# Patient Record
Sex: MALE | Race: BLACK OR AFRICAN AMERICAN | Employment: OTHER | URBAN - METROPOLITAN AREA
[De-identification: names, ages, dates, MRNs, and addresses within clinical notes are randomized per-mention and may not be internally consistent; named-entity substitution may affect disease eponyms.]

---

## 2023-03-20 LAB
AMPHETAMINE (PRESENCE) IN URINE BY SCREEN METHOD: ABNORMAL
BARBITURATES PRESENCE IN URINE BY SCREEN METHOD: ABNORMAL
BENZODIAZEPINE (PRESENCE) IN URINE BY SCREEN METHOD: ABNORMAL
CANNABINOIDS IN URINE BY SCREEN METHOD: ABNORMAL
COCAINE (PRESENCE) IN URINE BY SCREEN METHOD: ABNORMAL
DRUG SCREEN COMMENT URINE: ABNORMAL
FENTANYL URINE: ABNORMAL
METHADONE (PRESENCE) IN URINE BY SCREEN METHOD: ABNORMAL
OPIATES (PRESENCE) IN URINE BY SCREEN METHOD: ABNORMAL
OXYCODONE (PRESENCE) IN URINE BY SCREEN METHOD: ABNORMAL
PHENCYCLIDINE (PRESENCE) IN URINE BY SCREEN METHOD: ABNORMAL

## 2023-03-25 LAB
BUTALBITAL, URN, QUANT: <50 NG/ML
PENTOBARBITAL, URN, QUANT: <50 NG/ML
PHENOBARBITAL, URN, QUANT: >5000 NG/ML

## 2023-11-16 PROBLEM — G40.909 SEIZURE DISORDER (MULTI): Status: ACTIVE | Noted: 2023-11-16

## 2023-11-16 PROBLEM — G40.209 PARTIAL SYMPTOMATIC EPILEPSY WITH COMPLEX PARTIAL SEIZURES, NOT INTRACTABLE, WITHOUT STATUS EPILEPTICUS (MULTI): Status: ACTIVE | Noted: 2023-11-16

## 2023-11-16 PROBLEM — G25.0 BENIGN ESSENTIAL TREMOR: Status: ACTIVE | Noted: 2023-11-16

## 2023-11-16 PROBLEM — I10 ESSENTIAL HYPERTENSION, BENIGN: Status: ACTIVE | Noted: 2023-11-16

## 2023-11-16 RX ORDER — ROSUVASTATIN CALCIUM 10 MG/1
1 TABLET, COATED ORAL DAILY
COMMUNITY
Start: 2022-09-15

## 2023-11-16 RX ORDER — METOPROLOL SUCCINATE 50 MG/1
TABLET, EXTENDED RELEASE ORAL EVERY 12 HOURS
COMMUNITY
End: 2024-01-23 | Stop reason: SDUPTHER

## 2023-11-16 RX ORDER — AMLODIPINE BESYLATE 10 MG/1
1 TABLET ORAL DAILY
COMMUNITY
End: 2024-01-08 | Stop reason: ALTCHOICE

## 2023-11-16 RX ORDER — DIVALPROEX SODIUM 250 MG/1
TABLET, DELAYED RELEASE ORAL
COMMUNITY
Start: 2019-01-29 | End: 2024-01-04 | Stop reason: SDUPTHER

## 2023-11-16 RX ORDER — METFORMIN HYDROCHLORIDE 500 MG/1
TABLET ORAL EVERY 12 HOURS
COMMUNITY

## 2023-11-16 RX ORDER — LOSARTAN POTASSIUM 100 MG/1
1 TABLET ORAL DAILY
COMMUNITY

## 2023-11-16 RX ORDER — DIVALPROEX SODIUM 500 MG/1
TABLET, DELAYED RELEASE ORAL
COMMUNITY
Start: 2019-01-29 | End: 2024-01-04 | Stop reason: SDUPTHER

## 2023-11-16 RX ORDER — PHENOBARBITAL 32.4 MG/1
TABLET ORAL
COMMUNITY
Start: 2018-08-01 | End: 2024-01-04 | Stop reason: SDUPTHER

## 2023-11-16 RX ORDER — FLUOXETINE HYDROCHLORIDE 20 MG/1
1 CAPSULE ORAL DAILY
COMMUNITY
Start: 2022-02-09

## 2024-01-04 DIAGNOSIS — G40.209 PARTIAL SYMPTOMATIC EPILEPSY WITH COMPLEX PARTIAL SEIZURES, NOT INTRACTABLE, WITHOUT STATUS EPILEPTICUS (MULTI): Primary | ICD-10-CM

## 2024-01-04 RX ORDER — PHENOBARBITAL 32.4 MG/1
TABLET ORAL
Qty: 270 TABLET | Refills: 0 | Status: SHIPPED | OUTPATIENT
Start: 2024-01-04 | End: 2024-03-18 | Stop reason: SDUPTHER

## 2024-01-04 RX ORDER — DIVALPROEX SODIUM 500 MG/1
500 TABLET, DELAYED RELEASE ORAL 2 TIMES DAILY
Qty: 180 TABLET | Refills: 0 | Status: SHIPPED | OUTPATIENT
Start: 2024-01-04 | End: 2024-03-18 | Stop reason: SDUPTHER

## 2024-01-04 RX ORDER — DIVALPROEX SODIUM 250 MG/1
250 TABLET, DELAYED RELEASE ORAL 2 TIMES DAILY
Qty: 180 TABLET | Refills: 0 | Status: SHIPPED | OUTPATIENT
Start: 2024-01-04 | End: 2024-03-18 | Stop reason: SDUPTHER

## 2024-01-08 ENCOUNTER — OFFICE VISIT (OUTPATIENT)
Dept: CARDIOLOGY | Facility: CLINIC | Age: 69
End: 2024-01-08
Payer: MEDICARE

## 2024-01-08 VITALS
DIASTOLIC BLOOD PRESSURE: 80 MMHG | HEART RATE: 68 BPM | TEMPERATURE: 97.5 F | SYSTOLIC BLOOD PRESSURE: 120 MMHG | BODY MASS INDEX: 35.15 KG/M2 | WEIGHT: 252 LBS

## 2024-01-08 DIAGNOSIS — I10 ESSENTIAL HYPERTENSION, BENIGN: Primary | ICD-10-CM

## 2024-01-08 DIAGNOSIS — E78.5 HYPERLIPIDEMIA, UNSPECIFIED HYPERLIPIDEMIA TYPE: ICD-10-CM

## 2024-01-08 DIAGNOSIS — I49.3 PVC (PREMATURE VENTRICULAR CONTRACTION): ICD-10-CM

## 2024-01-08 PROCEDURE — 1125F AMNT PAIN NOTED PAIN PRSNT: CPT | Performed by: INTERNAL MEDICINE

## 2024-01-08 PROCEDURE — 3074F SYST BP LT 130 MM HG: CPT | Performed by: INTERNAL MEDICINE

## 2024-01-08 PROCEDURE — 3079F DIAST BP 80-89 MM HG: CPT | Performed by: INTERNAL MEDICINE

## 2024-01-08 PROCEDURE — 1157F ADVNC CARE PLAN IN RCRD: CPT | Performed by: INTERNAL MEDICINE

## 2024-01-08 PROCEDURE — 99214 OFFICE O/P EST MOD 30 MIN: CPT | Performed by: INTERNAL MEDICINE

## 2024-01-08 PROCEDURE — 1036F TOBACCO NON-USER: CPT | Performed by: INTERNAL MEDICINE

## 2024-01-08 RX ORDER — AMLODIPINE BESYLATE 5 MG/1
5 TABLET ORAL 2 TIMES DAILY
COMMUNITY
Start: 2023-11-19

## 2024-01-08 RX ORDER — LEVOTHYROXINE SODIUM 50 UG/1
50 TABLET ORAL DAILY
COMMUNITY
Start: 2023-12-11

## 2024-01-08 RX ORDER — NAPROXEN 500 MG/1
500 TABLET ORAL
COMMUNITY
Start: 2023-02-23

## 2024-01-08 RX ORDER — TIZANIDINE 4 MG/1
4 TABLET ORAL EVERY 8 HOURS PRN
COMMUNITY
Start: 2023-03-25

## 2024-01-08 ASSESSMENT — PAIN SCALES - GENERAL: PAINLEVEL: 8

## 2024-01-08 NOTE — PROGRESS NOTES
1. Seizure disorder  2. DVT/IVC filter  3. Chronic back pain  4. PVCs  5. Hypertension  6. Hyperlipidemia  7. obesity BMI greater than 34    Patient is a pleasant 68-year-old male with the above noted pertinent past medical history who presents today for follow-up.  He walks with help of a cane his level of activity remains limited, he has no complaints of orthopnea PND palpitation or syncopal episode, he states that he has remained compliant with his medication..    BMI is mildly increased now at 34, heart rate of 68 bpm and blood pressure 120/80 mmHg  Well-developed well-nourished male in no acute distress speaking full sentences no carotid bruits upstroke and volumes and central venous pressures are normal, heart rate and rhythm are regular S1 and S2 are normal no gallop or murmurs, lungs are clear to auscultation, abdomen is obese positive bowel sounds soft and nontender    No new lab work is available    Hypertension under good control medication list is reviewed no changes indicated  Hyperlipidemia patient is provided with requisition for CBC CMP and lipid panel will notify patient of the test results  Overweight heart healthy diet and weight loss recommended  DVT with history of IVC filter  Premature ventricular complexes now with no complaints of palpitation  Patient is a non-smoker  Return to my clinic in 6 months.

## 2024-01-23 DIAGNOSIS — I10 ESSENTIAL HYPERTENSION, BENIGN: Primary | ICD-10-CM

## 2024-01-25 DIAGNOSIS — I10 ESSENTIAL HYPERTENSION, BENIGN: ICD-10-CM

## 2024-01-25 RX ORDER — METOPROLOL SUCCINATE 50 MG/1
50 TABLET, EXTENDED RELEASE ORAL EVERY 12 HOURS
Qty: 60 TABLET | Refills: 5 | Status: SHIPPED | OUTPATIENT
Start: 2024-01-25 | End: 2024-01-30

## 2024-01-30 RX ORDER — METOPROLOL SUCCINATE 50 MG/1
50 TABLET, EXTENDED RELEASE ORAL
Qty: 180 TABLET | Refills: 3 | Status: SHIPPED | OUTPATIENT
Start: 2024-01-30 | End: 2025-01-29

## 2024-03-18 ENCOUNTER — OFFICE VISIT (OUTPATIENT)
Dept: NEUROLOGY | Facility: CLINIC | Age: 69
End: 2024-03-18
Payer: MEDICARE

## 2024-03-18 VITALS
WEIGHT: 249 LBS | SYSTOLIC BLOOD PRESSURE: 164 MMHG | HEIGHT: 69 IN | BODY MASS INDEX: 36.88 KG/M2 | HEART RATE: 74 BPM | TEMPERATURE: 96.9 F | DIASTOLIC BLOOD PRESSURE: 94 MMHG

## 2024-03-18 DIAGNOSIS — G40.209 PARTIAL SYMPTOMATIC EPILEPSY WITH COMPLEX PARTIAL SEIZURES, NOT INTRACTABLE, WITHOUT STATUS EPILEPTICUS (MULTI): Primary | ICD-10-CM

## 2024-03-18 DIAGNOSIS — Z79.899 CONTROLLED SUBSTANCE AGREEMENT SIGNED: ICD-10-CM

## 2024-03-18 PROCEDURE — 3080F DIAST BP >= 90 MM HG: CPT | Performed by: PSYCHIATRY & NEUROLOGY

## 2024-03-18 PROCEDURE — 1159F MED LIST DOCD IN RCRD: CPT | Performed by: PSYCHIATRY & NEUROLOGY

## 2024-03-18 PROCEDURE — 1036F TOBACCO NON-USER: CPT | Performed by: PSYCHIATRY & NEUROLOGY

## 2024-03-18 PROCEDURE — 3077F SYST BP >= 140 MM HG: CPT | Performed by: PSYCHIATRY & NEUROLOGY

## 2024-03-18 PROCEDURE — 99214 OFFICE O/P EST MOD 30 MIN: CPT | Performed by: PSYCHIATRY & NEUROLOGY

## 2024-03-18 PROCEDURE — 1157F ADVNC CARE PLAN IN RCRD: CPT | Performed by: PSYCHIATRY & NEUROLOGY

## 2024-03-18 RX ORDER — ASPIRIN 325 MG
TABLET, DELAYED RELEASE (ENTERIC COATED) ORAL
COMMUNITY
Start: 2009-10-22

## 2024-03-18 RX ORDER — PHENOBARBITAL 32.4 MG/1
TABLET ORAL
Qty: 270 TABLET | Refills: 1 | Status: SHIPPED | OUTPATIENT
Start: 2024-03-18 | End: 2024-04-15 | Stop reason: SDUPTHER

## 2024-03-18 RX ORDER — DIVALPROEX SODIUM 250 MG/1
250 TABLET, DELAYED RELEASE ORAL 2 TIMES DAILY
Qty: 180 TABLET | Refills: 3 | Status: SHIPPED | OUTPATIENT
Start: 2024-03-18 | End: 2025-03-18

## 2024-03-18 RX ORDER — DIVALPROEX SODIUM 500 MG/1
500 TABLET, DELAYED RELEASE ORAL 2 TIMES DAILY
Qty: 180 TABLET | Refills: 3 | Status: SHIPPED | OUTPATIENT
Start: 2024-03-18 | End: 2025-03-18

## 2024-03-18 RX ORDER — HYDROGEN PEROXIDE 3 %
20 SOLUTION, NON-ORAL MISCELLANEOUS
COMMUNITY

## 2024-03-18 RX ORDER — TAMSULOSIN HYDROCHLORIDE 0.4 MG/1
0.4 CAPSULE ORAL DAILY
COMMUNITY

## 2024-03-18 NOTE — PROGRESS NOTES
NEUROLOGY OUTPATIENT FOLLOW-UP NOTE    Assessment/Plan   Diagnoses and all orders for this visit:  Partial symptomatic epilepsy with complex partial seizures, not intractable, without status epilepticus (CMS/HCC)  -     Phenobarbital level; Future  -     Valproic acid level, total; Future  -     PHENobarbitaL (Luminal) 32.4 mg tablet; 1 by mouth in morning, 2 in evening  -     divalproex (Depakote) 250 mg EC tablet; Take 1 tablet (250 mg) by mouth 2 times a day. Take with 500 mg tablet  -     divalproex (Depakote) 500 mg EC tablet; Take 1 tablet (500 mg) by mouth 2 times a day. Take with 250 mg tablet  Controlled substance agreement signed  -     Drug Screen, Urine With Reflex to Confirmation; Future      IMPRESSION: Stable complex partial seizures and benign essential tremor. Lumbar strain     PLAN: To continue divalproex 750 mg bid and phenobarbital 30 mg qam and 60 mg qpm. I will see him again in another year or prn.      Maxi Garces Jr., M.D., FAAN   ----------    I have personally reviewed the [place] report for VINEET YADAV. I have considered the risks of abuse, dependence, addiction and diversion.   Last urine drug screening date/ordered today: 03/18/2024  Controlled Substance Agreement:   I have printed this form and reviewed each line item with the patient and the patient has verbalized understanding.   Date of the last Controlled Substance Agreement: 03/18/2024    Subjective     Vineet Yadav is a 69 y.o. year old male here for follow-up.    No seizures since the last visit.  He denies new focal neurological symptoms including dysarthria, dysphagia, diplopia, focal weakness, focal sensory change, ataxia, vertigo, or bowel/bladder incontinence, among others.      Past Medical History:   Diagnosis Date    Dry eye syndrome of bilateral lacrimal glands     Dry eyes    Other conditions influencing health status     Abdominal Paracentesis (Diagnostic)    Personal history of other diseases of the  circulatory system     History of hypertension    Personal history of other diseases of the musculoskeletal system and connective tissue     History of osteoporosis    Personal history of other endocrine, nutritional and metabolic disease     History of high cholesterol    Personal history of other specified conditions     History of seizure    Unspecified fracture of right femur, initial encounter for closed fracture (CMS/HCC)     Femur fracture, right    Ventricular premature depolarization     Premature ventricular contraction     Past Surgical History:   Procedure Laterality Date    OTHER SURGICAL HISTORY  04/11/2018    Reported Hx Of Hip Replacement    OTHER SURGICAL HISTORY  12/01/2021    Complete colonoscopy    OTHER SURGICAL HISTORY  12/01/2021    Femur fracture repair    OTHER SURGICAL HISTORY  12/01/2021    Surgery    OTHER SURGICAL HISTORY  12/01/2021    Tonsillectomy     Social History     Tobacco Use    Smoking status: Never     Passive exposure: Never    Smokeless tobacco: Never   Substance Use Topics    Alcohol use: Never     family history is not on file.    Current Outpatient Medications:     aspirin (Ecotrin) 325 mg EC tablet, Take by mouth., Disp: , Rfl:     FLUoxetine (PROzac) 20 mg capsule, Take 1 capsule (20 mg) by mouth once daily., Disp: , Rfl:     rosuvastatin (Crestor) 10 mg tablet, Take 1 tablet (10 mg) by mouth once daily., Disp: , Rfl:     amLODIPine (Norvasc) 5 mg tablet, Take 1 tablet (5 mg) by mouth 2 times a day., Disp: , Rfl:     divalproex (Depakote) 250 mg EC tablet, Take 1 tablet (250 mg) by mouth 2 times a day. Take with 500 mg tablet, Disp: 180 tablet, Rfl: 0    divalproex (Depakote) 500 mg EC tablet, Take 1 tablet (500 mg) by mouth 2 times a day. Take with 250 mg tablet, Disp: 180 tablet, Rfl: 0    esomeprazole (NexIUM) 20 mg DR capsule, Take 1 capsule (20 mg) by mouth once daily., Disp: , Rfl:     levothyroxine (Synthroid, Levoxyl) 50 mcg tablet, Take 1 tablet (50 mcg) by  "mouth once daily., Disp: , Rfl:     losartan (Cozaar) 100 mg tablet, Take 1 tablet (100 mg) by mouth once daily., Disp: , Rfl:     metFORMIN (Glucophage) 500 mg tablet, Take by mouth every 12 hours., Disp: , Rfl:     metoprolol succinate XL (Toprol-XL) 50 mg 24 hr tablet, Take 1 tablet (50 mg) by mouth 2 times a day., Disp: 180 tablet, Rfl: 3    naproxen (Naprosyn) 500 mg tablet, Take 1 tablet (500 mg) by mouth 2 times a day with meals., Disp: , Rfl:     PHENobarbitaL (Luminal) 32.4 mg tablet, 1 by mouth in morning, 2 in evening, Disp: 270 tablet, Rfl: 0    tamsulosin (Flomax) 0.4 mg 24 hr capsule, Take 1 capsule (0.4 mg) by mouth once daily., Disp: , Rfl:     tiZANidine (Zanaflex) 4 mg tablet, Take 1 tablet (4 mg) by mouth every 8 hours if needed., Disp: , Rfl:   Allergies   Allergen Reactions    Cefazolin Diarrhea    Codeine Nausea/vomiting    Metoclopramide Unknown    Phenytoin Confusion and Unknown       Objective     BP (!) 164/94   Pulse 74   Temp 36.1 °C (96.9 °F)   Ht 1.753 m (5' 9\")   Wt 113 kg (249 lb)   BMI 36.77 kg/m²     CONSTITUTIONAL:  No acute distress    MENTAL STATUS:  Awake, alert, fully oriented to self, place, and time, with present short-term memory, good awareness of recent events, normal attention span, concentration, and fund of knowledge.    SPEECH AND LANGUAGE:  Can name and repeat, follows all commands, has no dysarthria    CRANIAL NERVES:  II-Vision present, visual fields full to confrontational testing    III/IV/VI--EOMs are present in all directions.  Pupils are symmetrically reactive in dim light.  No ptosis.    V--Normal facial sensation.    VII--No facial asymmetry.    VIII--Hearing present to voice bilaterally.    IX/X--Symmetric soft palate rise.    XI--Normal trapezius power bilaterally.    XII--Tongue protrudes without deviation.    MOTOR:  Normal power, tone, and bulk in both arms and both legs.    SENSORY:  Normal pin sensation in both arms and both legs without " distal-proximal gradient, asymmetry, or spinal sensory level.    COORDINATION:  Normal finger-to-nose and heel-to-shin testing in both arms and both legs.    REFLEXES are normal and symmetric at the biceps, triceps, brachioradialis, patella, and ankle.  The plantar responses are flexor.    GAIT is antalgic but otherwise normal, without steppage, ataxia, shuffling, or spasticity.  He is more steady with a single-post cane.      Maxi Garces Jr., M.D., FAAN

## 2024-04-10 DIAGNOSIS — G40.209 PARTIAL SYMPTOMATIC EPILEPSY WITH COMPLEX PARTIAL SEIZURES, NOT INTRACTABLE, WITHOUT STATUS EPILEPTICUS (MULTI): ICD-10-CM

## 2024-04-19 LAB
Lab: 20.1 UG/ML (ref 15–40)
NON-UH HIE A/G RATIO: 1.2
NON-UH HIE ALB: 4 G/DL (ref 3.4–5)
NON-UH HIE ALK PHOS: 110 UNIT/L (ref 45–117)
NON-UH HIE AMPHETAMINES, U: NEGATIVE
NON-UH HIE BARBITUATES, U: POSITIVE
NON-UH HIE BENZODIAZEPINES, U: NEGATIVE
NON-UH HIE BILIRUBIN, TOTAL: 0.3 MG/DL (ref 0.3–1.2)
NON-UH HIE BUN/CREAT RATIO: 20
NON-UH HIE BUN: 16 MG/DL (ref 9–23)
NON-UH HIE CALCIUM: 9.4 MG/DL (ref 8.7–10.4)
NON-UH HIE CALCULATED LDL CHOLESTEROL: 112 MG/DL (ref 60–130)
NON-UH HIE CALCULATED OSMOLALITY: 266 MOSM/KG (ref 275–295)
NON-UH HIE CHLORIDE: 93 MMOL/L (ref 98–107)
NON-UH HIE CHOLESTEROL: 194 MG/DL (ref 100–200)
NON-UH HIE CO2, VENOUS: 28 MMOL/L (ref 20–31)
NON-UH HIE COCAINE, U: NEGATIVE
NON-UH HIE CREATININE: 0.8 MG/DL (ref 0.6–1.1)
NON-UH HIE DATE LAST DOSE: ABNORMAL
NON-UH HIE DATE LAST DOSE: NORMAL
NON-UH HIE ECSTASY, U: NEGATIVE
NON-UH HIE FENTANYL, U: NEGATIVE
NON-UH HIE GFR AA: >60
NON-UH HIE GLOBULIN: 3.2 G/DL
NON-UH HIE GLOMERULAR FILTRATION RATE: >60 ML/MIN/1.73M?
NON-UH HIE GLUCOSE: 138 MG/DL (ref 74–106)
NON-UH HIE GOT: 28 UNIT/L (ref 15–37)
NON-UH HIE GPT: 44 UNIT/L (ref 10–49)
NON-UH HIE HCT: 41.6 % (ref 41–52)
NON-UH HIE HDL CHOLESTEROL: 48 MG/DL (ref 40–60)
NON-UH HIE HGB: 14.6 G/DL (ref 13.5–17.5)
NON-UH HIE INSTR WBC ND: 6.1
NON-UH HIE K: 4.7 MMOL/L (ref 3.5–5.1)
NON-UH HIE MCH: 32.3 PG (ref 27–34)
NON-UH HIE MCHC: 35.1 G/DL (ref 32–37)
NON-UH HIE MCV: 92.1 FL (ref 80–100)
NON-UH HIE MPV: 7.1 FL (ref 7.4–10.4)
NON-UH HIE NA: 131 MMOL/L (ref 135–145)
NON-UH HIE OPIATES, U: NEGATIVE
NON-UH HIE PCP, U: NEGATIVE
NON-UH HIE PLATELET: 164 X10 (ref 150–450)
NON-UH HIE RBC: 4.52 X10 (ref 4.7–6.1)
NON-UH HIE RDW: 14 % (ref 11.5–14.5)
NON-UH HIE THC, U: NEGATIVE
NON-UH HIE TIME LAST DOSE: ABNORMAL
NON-UH HIE TIME LAST DOSE: NORMAL
NON-UH HIE TOTAL CHOL/HDL CHOL RATIO: 4
NON-UH HIE TOTAL PROTEIN: 7.2 G/DL (ref 5.7–8.2)
NON-UH HIE TRIGLYCERIDES: 172 MG/DL (ref 30–150)
NON-UH HIE VALPROIC ACID: 100.7 UG/ML (ref 50–100)
NON-UH HIE WBC: 6.1 X10 (ref 4.5–11)

## 2024-04-19 RX ORDER — PHENOBARBITAL 32.4 MG/1
TABLET ORAL
Qty: 270 TABLET | Refills: 1 | Status: SHIPPED | OUTPATIENT
Start: 2024-04-19

## 2024-07-08 ENCOUNTER — APPOINTMENT (OUTPATIENT)
Dept: CARDIOLOGY | Facility: CLINIC | Age: 69
End: 2024-07-08
Payer: MEDICARE

## 2024-07-10 ENCOUNTER — APPOINTMENT (OUTPATIENT)
Dept: CARDIOLOGY | Facility: CLINIC | Age: 69
End: 2024-07-10
Payer: MEDICARE

## 2024-08-19 ENCOUNTER — NURSING HOME VISIT (OUTPATIENT)
Dept: POST ACUTE CARE | Facility: EXTERNAL LOCATION | Age: 69
End: 2024-08-19
Payer: MEDICARE

## 2024-08-19 DIAGNOSIS — I10 ESSENTIAL HYPERTENSION, BENIGN: ICD-10-CM

## 2024-08-19 DIAGNOSIS — G25.0 BENIGN ESSENTIAL TREMOR: Primary | ICD-10-CM

## 2024-08-19 DIAGNOSIS — G40.209 PARTIAL SYMPTOMATIC EPILEPSY WITH COMPLEX PARTIAL SEIZURES, NOT INTRACTABLE, WITHOUT STATUS EPILEPTICUS (MULTI): ICD-10-CM

## 2024-08-19 DIAGNOSIS — G40.909 SEIZURE DISORDER (MULTI): ICD-10-CM

## 2024-08-19 PROCEDURE — 99305 1ST NF CARE MODERATE MDM 35: CPT | Performed by: INTERNAL MEDICINE

## 2024-08-19 NOTE — LETTER
Patient: Vineet Lira  : 1955    Encounter Date: 2024    HISTORY AND PHYSICAL    Subjective  Chief complaint: Vineet Lira is a 69 y.o. male who is an acute skilled patient being seen and evaluated for weakness    HPI:  Patient is a 69 year old male with a past medical history of seizures, HTN, prediabetes, HLD and weakness. Patient was admitted to the skilled nursing facility after being in the hospital. Patient presented to the ED after a fall at home. Patient was found to have more falls and gait instability in ED and new relatively rapid cognitive impairment. MRI showed acute fractures of t12 and L2 and foraminal stenosis at L4-5. Patient was evaluated by neuro, PT/OT and was discharged to the skilled nursing facility.     Objective  Vital signs: 140/60, 97.3, 109, 18, 95%    Physical Exam  Constitutional:       General: He is not in acute distress.  Eyes:      Extraocular Movements: Extraocular movements intact.   Pulmonary:      Effort: Pulmonary effort is normal.   Musculoskeletal:      Cervical back: Neck supple.   Neurological:      Mental Status: He is alert.   Psychiatric:         Mood and Affect: Mood normal.         Behavior: Behavior is cooperative.         Assessment/Plan  Problem List Items Addressed This Visit       Benign essential tremor - Primary     Continue current medications          Essential hypertension, benign     Continue current medications          Partial symptomatic epilepsy with complex partial seizures, not intractable, without status epilepticus (Multi)     Continue current medications          Seizure disorder (Multi)     Continue current medications           Medications, treatments, and labs reviewed  Continue medications and treatments as listed in [facility]    Scribe Attestation  I, Ingris Bennett   attest that this documentation has been prepared under the direction and in the presence of Kyle Jackson DO.    Provider Attestation - Ingris  documentation  All medical record entries made by the Scribe were at my direction and personally dictated by me. I have reviewed the chart and agree that the record accurately reflects my personal performance of the history, physical exam, discussion and plan.    Kyle Jackson DO        Electronically Signed By: Kyle Jackson DO   8/26/24 10:08 PM

## 2024-08-20 ENCOUNTER — NURSING HOME VISIT (OUTPATIENT)
Dept: POST ACUTE CARE | Facility: EXTERNAL LOCATION | Age: 69
End: 2024-08-20
Payer: MEDICARE

## 2024-08-20 DIAGNOSIS — I10 ESSENTIAL HYPERTENSION, BENIGN: ICD-10-CM

## 2024-08-20 DIAGNOSIS — G40.209 PARTIAL SYMPTOMATIC EPILEPSY WITH COMPLEX PARTIAL SEIZURES, NOT INTRACTABLE, WITHOUT STATUS EPILEPTICUS (MULTI): ICD-10-CM

## 2024-08-20 DIAGNOSIS — G40.909 SEIZURE DISORDER (MULTI): Primary | ICD-10-CM

## 2024-08-20 PROCEDURE — 99308 SBSQ NF CARE LOW MDM 20: CPT | Performed by: REGISTERED NURSE

## 2024-08-20 NOTE — PROGRESS NOTES
HISTORY AND PHYSICAL    Subjective   Chief complaint: Vineet Lira is a 69 y.o. male who is an acute skilled patient being seen and evaluated for weakness    HPI:  Patient is a 69 year old male with a past medical history of seizures, HTN, prediabetes, HLD and weakness. Patient was admitted to the skilled nursing facility after being in the hospital. Patient presented to the ED after a fall at home. Patient was found to have more falls and gait instability in ED and new relatively rapid cognitive impairment. MRI showed acute fractures of t12 and L2 and foraminal stenosis at L4-5. Patient was evaluated by neuro, PT/OT and was discharged to the skilled nursing facility.     Objective   Vital signs: 140/60, 97.3, 109, 18, 95%    Physical Exam  Constitutional:       General: He is not in acute distress.  Eyes:      Extraocular Movements: Extraocular movements intact.   Pulmonary:      Effort: Pulmonary effort is normal.   Musculoskeletal:      Cervical back: Neck supple.   Neurological:      Mental Status: He is alert.   Psychiatric:         Mood and Affect: Mood normal.         Behavior: Behavior is cooperative.         Assessment/Plan   Problem List Items Addressed This Visit       Benign essential tremor - Primary     Continue current medications          Essential hypertension, benign     Continue current medications          Partial symptomatic epilepsy with complex partial seizures, not intractable, without status epilepticus (Multi)     Continue current medications          Seizure disorder (Multi)     Continue current medications           Medications, treatments, and labs reviewed  Continue medications and treatments as listed in [facility]    Scribe Attestation  I, Ingris Bennett   attest that this documentation has been prepared under the direction and in the presence of Kyle Jackson DO.    Provider Attestation - Scribe documentation  All medical record entries made by the Scribe were at my  direction and personally dictated by me. I have reviewed the chart and agree that the record accurately reflects my personal performance of the history, physical exam, discussion and plan.    Kyle Jackson, DO

## 2024-08-20 NOTE — LETTER
Patient: Vineet Lira  : 1955    Encounter Date: 2024    PROGRESS NOTE    Subjective  Chief complaint: Vineet Lira is a 69 y.o. male who is an acute skilled patient being seen and evaluated for weakness    HPI:  24 Patient admitted to SNF for therapy d/t weakness after recent hospitalization.   Patient requires assist with ADLs and transfers.  No new complaints.      Objective  Vital signs: 188/76, 97.3, 109, 18, 101.0, 95%    Physical Exam  Constitutional:       General: He is not in acute distress.  Eyes:      Extraocular Movements: Extraocular movements intact.   Pulmonary:      Effort: Pulmonary effort is normal.   Musculoskeletal:      Cervical back: Neck supple.   Neurological:      Mental Status: He is alert.   Psychiatric:         Mood and Affect: Mood normal.         Behavior: Behavior is cooperative.         Assessment/Plan  Problem List Items Addressed This Visit       Essential hypertension, benign     Continue current medications          Partial symptomatic epilepsy with complex partial seizures, not intractable, without status epilepticus (Multi)     Continue current medications            Seizure disorder (Multi) - Primary     Continue current medications              Medications, treatments, and labs reviewed  Continue medications and treatments as listed in [facility]    Scribe Attestation  IJulianna Scribe   attest that this documentation has been prepared under the direction and in the presence of NORMA Cao.    Provider Attestation - Scribe documentation  All medical record entries made by the Scribe were at my direction and personally dictated by me. I have reviewed the chart and agree that the record accurately reflects my personal performance of the history, physical exam, discussion and plan.    NORMA Cao        Electronically Signed By: NORMA Cao   24  9:05 PM

## 2024-08-21 ENCOUNTER — NURSING HOME VISIT (OUTPATIENT)
Dept: POST ACUTE CARE | Facility: EXTERNAL LOCATION | Age: 69
End: 2024-08-21
Payer: MEDICARE

## 2024-08-21 DIAGNOSIS — G40.909 SEIZURE DISORDER (MULTI): ICD-10-CM

## 2024-08-21 DIAGNOSIS — G25.0 BENIGN ESSENTIAL TREMOR: ICD-10-CM

## 2024-08-21 DIAGNOSIS — R53.1 WEAKNESS: ICD-10-CM

## 2024-08-21 DIAGNOSIS — I10 ESSENTIAL HYPERTENSION, BENIGN: ICD-10-CM

## 2024-08-21 DIAGNOSIS — R00.2 PALPITATIONS: Primary | ICD-10-CM

## 2024-08-21 PROCEDURE — 99309 SBSQ NF CARE MODERATE MDM 30: CPT | Performed by: REGISTERED NURSE

## 2024-08-21 NOTE — LETTER
Patient: Vineet Lira  : 1955    Encounter Date: 2024    PROGRESS NOTE    Subjective  Chief complaint: Vineet Lira is a 69 y.o. male who is an acute skilled patient being seen and evaluated for weakness    HPI:  24 Patient seen and examined at [facility].  Therapy has been working with the patient to improve strength, endurance, ADLs, and transfers d/t generalized weakness.  Patient has no acute concerns today.    Objective  Vital signs: 138/90, 97, 72, 14, 101.0, 95%    Physical Exam  Constitutional:       General: He is not in acute distress.  Eyes:      Extraocular Movements: Extraocular movements intact.   Pulmonary:      Effort: Pulmonary effort is normal.   Musculoskeletal:      Cervical back: Neck supple.   Neurological:      Mental Status: He is alert.   Psychiatric:         Mood and Affect: Mood normal.         Behavior: Behavior is cooperative.         Assessment/Plan  Problem List Items Addressed This Visit       Benign essential tremor     Continue current medications          Essential hypertension, benign     Continue current medications          Seizure disorder (Multi)     Continue current medications             Weakness     Pt/ot          Palpitations - Primary     Pt c/o chest palpitations received beta blocker palpitations resolved   Monitor   If occurs again notify MD/CNP and obtain EKG           Medications, treatments, and labs reviewed  Continue medications and treatments as listed in [facility]    Scribe Attestation  I, Ingris Bennett   attest that this documentation has been prepared under the direction and in the presence of NORMA Cao.    Provider Attestation - Scribe documentation  All medical record entries made by the Scribe were at my direction and personally dictated by me. I have reviewed the chart and agree that the record accurately reflects my personal performance of the history, physical exam, discussion and plan.    Kyle JORDAN  NORMA Mena        Electronically Signed By: NORMA Cao   8/29/24 11:38 AM

## 2024-08-22 NOTE — PROGRESS NOTES
PROGRESS NOTE    Subjective   Chief complaint: Vineet Lira is a 69 y.o. male who is an acute skilled patient being seen and evaluated for weakness    HPI:  8/20/24 Patient admitted to SNF for therapy d/t weakness after recent hospitalization.   Patient requires assist with ADLs and transfers.  No new complaints.      Objective   Vital signs: 188/76, 97.3, 109, 18, 101.0, 95%    Physical Exam  Constitutional:       General: He is not in acute distress.  Eyes:      Extraocular Movements: Extraocular movements intact.   Pulmonary:      Effort: Pulmonary effort is normal.   Musculoskeletal:      Cervical back: Neck supple.   Neurological:      Mental Status: He is alert.   Psychiatric:         Mood and Affect: Mood normal.         Behavior: Behavior is cooperative.         Assessment/Plan   Problem List Items Addressed This Visit       Essential hypertension, benign     Continue current medications          Partial symptomatic epilepsy with complex partial seizures, not intractable, without status epilepticus (Multi)     Continue current medications            Seizure disorder (Multi) - Primary     Continue current medications              Medications, treatments, and labs reviewed  Continue medications and treatments as listed in [facility]    Scribe Attestation  IJulianna Scribe   attest that this documentation has been prepared under the direction and in the presence of NORMA Cao.    Provider Attestation - Scribe documentation  All medical record entries made by the Scribe were at my direction and personally dictated by me. I have reviewed the chart and agree that the record accurately reflects my personal performance of the history, physical exam, discussion and plan.    NORMA Cao

## 2024-08-23 ENCOUNTER — NURSING HOME VISIT (OUTPATIENT)
Dept: POST ACUTE CARE | Facility: EXTERNAL LOCATION | Age: 69
End: 2024-08-23
Payer: MEDICARE

## 2024-08-23 DIAGNOSIS — R53.1 WEAKNESS: ICD-10-CM

## 2024-08-23 DIAGNOSIS — G25.0 BENIGN ESSENTIAL TREMOR: ICD-10-CM

## 2024-08-23 DIAGNOSIS — I10 ESSENTIAL HYPERTENSION, BENIGN: ICD-10-CM

## 2024-08-23 DIAGNOSIS — G40.909 SEIZURE DISORDER (MULTI): Primary | ICD-10-CM

## 2024-08-23 PROCEDURE — 99308 SBSQ NF CARE LOW MDM 20: CPT | Performed by: REGISTERED NURSE

## 2024-08-23 NOTE — LETTER
Patient: Vineet Lira  : 1955    Encounter Date: 2024    PROGRESS NOTE    Subjective  Chief complaint: Vineet Lira is a 69 y.o. male who is an acute skilled patient being seen and evaluated for weakness    HPI:  24 Patient admitted to SNF for therapy d/t weakness after recent hospitalization.   Patient requires assist with ADLs and transfers.  No new complaints.      24 Patient has no new complaints or concerns today.  Patient is working in therapy.  Denies n/v/f/c.    Objective  Vital signs: 176/90, 97.6, 77, 14, 101.0, 95%    Physical Exam  Constitutional:       General: He is not in acute distress.  Eyes:      Extraocular Movements: Extraocular movements intact.   Pulmonary:      Effort: Pulmonary effort is normal.   Musculoskeletal:      Cervical back: Neck supple.   Neurological:      Mental Status: He is alert.   Psychiatric:         Mood and Affect: Mood normal.         Behavior: Behavior is cooperative.         Assessment/Plan  Problem List Items Addressed This Visit       Benign essential tremor     Continue current medications          Essential hypertension, benign     Continue current medications          Seizure disorder (Multi) - Primary     Continue current medications             Weakness     Pt/ot           Medications, treatments, and labs reviewed  Continue medications and treatments as listed in [facility]    Scribe Attestation  IJulianna Scribe   attest that this documentation has been prepared under the direction and in the presence of NORMA Cao.    Provider Attestation - Scribe documentation  All medical record entries made by the Scribe were at my direction and personally dictated by me. I have reviewed the chart and agree that the record accurately reflects my personal performance of the history, physical exam, discussion and plan.    NORMA Cao        Electronically Signed By: NORMA Cao   24   9:32 AM

## 2024-08-26 ENCOUNTER — NURSING HOME VISIT (OUTPATIENT)
Dept: POST ACUTE CARE | Facility: EXTERNAL LOCATION | Age: 69
End: 2024-08-26
Payer: MEDICARE

## 2024-08-26 DIAGNOSIS — G40.909 SEIZURE DISORDER (MULTI): ICD-10-CM

## 2024-08-26 DIAGNOSIS — R53.1 WEAKNESS: Primary | ICD-10-CM

## 2024-08-26 DIAGNOSIS — I10 ESSENTIAL HYPERTENSION, BENIGN: ICD-10-CM

## 2024-08-26 PROCEDURE — 99308 SBSQ NF CARE LOW MDM 20: CPT | Performed by: REGISTERED NURSE

## 2024-08-26 NOTE — LETTER
Patient: Vineet Lira  : 1955    Encounter Date: 2024    PROGRESS NOTE    Subjective  Chief complaint: Vineet Lira is a 69 y.o. male who is an acute skilled patient being seen and evaluated for weakness    HPI:  24 Patient admitted to SNF for therapy d/t weakness after recent hospitalization.   Patient requires assist with ADLs and transfers.  No new complaints.      24 Patient has no new complaints or concerns today.  Patient is working in therapy.  Denies n/v/f/c.    24 Therapy has been working with the patient to improve strength and endurance with ADLs, transfers, and mobility.  Patient continues to work toward goals.  Patient is stable and has no new complaints.  Nursing staff voices no new concerns today.    Objective  Vital signs: 138/90, 97, 72, 14, 101.0, 95%    Physical Exam  Constitutional:       General: He is not in acute distress.  Eyes:      Extraocular Movements: Extraocular movements intact.   Pulmonary:      Effort: Pulmonary effort is normal.   Musculoskeletal:      Cervical back: Neck supple.   Neurological:      Mental Status: He is alert.   Psychiatric:         Mood and Affect: Mood normal.         Behavior: Behavior is cooperative.         Assessment/Plan  Problem List Items Addressed This Visit       Essential hypertension, benign     Continue current medications          Seizure disorder (Multi)     Continue current medications             Weakness - Primary     Pt/ot           Medications, treatments, and labs reviewed  Continue medications and treatments as listed in [facility]    Scribe Attestation  I, Ingris Bennett   attest that this documentation has been prepared under the direction and in the presence of NORMA Cao.    Provider Attestation - Scribe documentation  All medical record entries made by the Scribe were at my direction and personally dictated by me. I have reviewed the chart and agree that the record accurately reflects  my personal performance of the history, physical exam, discussion and plan.    NORMA Cao        Electronically Signed By: NORMA Cao   9/2/24  1:50 PM

## 2024-08-27 ENCOUNTER — NURSING HOME VISIT (OUTPATIENT)
Dept: POST ACUTE CARE | Facility: EXTERNAL LOCATION | Age: 69
End: 2024-08-27
Payer: MEDICARE

## 2024-08-27 DIAGNOSIS — I10 ESSENTIAL HYPERTENSION, BENIGN: ICD-10-CM

## 2024-08-27 DIAGNOSIS — G40.909 SEIZURE DISORDER (MULTI): ICD-10-CM

## 2024-08-27 DIAGNOSIS — R53.1 WEAKNESS: Primary | ICD-10-CM

## 2024-08-27 PROCEDURE — 99308 SBSQ NF CARE LOW MDM 20: CPT | Performed by: REGISTERED NURSE

## 2024-08-27 NOTE — LETTER
Patient: Vineet Lira  : 1955    Encounter Date: 2024    PROGRESS NOTE    Subjective  Chief complaint: Vineet Lira is a 69 y.o. male who is an acute skilled patient being seen and evaluated for weakness    HPI:  24 Patient admitted to SNF for therapy d/t weakness after recent hospitalization.   Patient requires assist with ADLs and transfers.  No new complaints.      24 Patient has no new complaints or concerns today.  Patient is working in therapy.  Denies n/v/f/c.    24 Therapy has been working with the patient to improve strength and endurance with ADLs, transfers, and mobility.  Patient continues to work toward goals.  Patient is stable and has no new complaints.  Nursing staff voices no new concerns today.    24 Patient seen and examined at bedside.  Patient denies n/v/f/c.  Continues working in therapy.  No new complaints.    Objective  Vital signs: 138/90, 97, 72, 14, 101.0, 95%    Physical Exam  Constitutional:       General: He is not in acute distress.  Eyes:      Extraocular Movements: Extraocular movements intact.   Pulmonary:      Effort: Pulmonary effort is normal.   Musculoskeletal:      Cervical back: Neck supple.   Neurological:      Mental Status: He is alert.   Psychiatric:         Mood and Affect: Mood normal.         Behavior: Behavior is cooperative.         Assessment/Plan  Problem List Items Addressed This Visit       Essential hypertension, benign     Continue current medications          Seizure disorder (Multi)     Continue current medications             Weakness - Primary     Pt/ot           Medications, treatments, and labs reviewed  Continue medications and treatments as listed in [facility]    Scribe Attestation  I, Ingris Bennett   attest that this documentation has been prepared under the direction and in the presence of NORMA Cao.    Provider Attestation - Scribe documentation  All medical record entries made by the Scribe  were at my direction and personally dictated by me. I have reviewed the chart and agree that the record accurately reflects my personal performance of the history, physical exam, discussion and plan.    NORMA Cao        Electronically Signed By: NORMA Cao   9/3/24  5:22 PM

## 2024-08-27 NOTE — PROGRESS NOTES
PROGRESS NOTE    Subjective   Chief complaint: Vineet Lira is a 69 y.o. male who is an acute skilled patient being seen and evaluated for weakness    HPI:  8/20/24 Patient admitted to SNF for therapy d/t weakness after recent hospitalization.   Patient requires assist with ADLs and transfers.  No new complaints.      8/23/24 Patient has no new complaints or concerns today.  Patient is working in therapy.  Denies n/v/f/c.    Objective   Vital signs: 176/90, 97.6, 77, 14, 101.0, 95%    Physical Exam  Constitutional:       General: He is not in acute distress.  Eyes:      Extraocular Movements: Extraocular movements intact.   Pulmonary:      Effort: Pulmonary effort is normal.   Musculoskeletal:      Cervical back: Neck supple.   Neurological:      Mental Status: He is alert.   Psychiatric:         Mood and Affect: Mood normal.         Behavior: Behavior is cooperative.         Assessment/Plan   Problem List Items Addressed This Visit       Benign essential tremor     Continue current medications          Essential hypertension, benign     Continue current medications          Seizure disorder (Multi) - Primary     Continue current medications             Weakness     Pt/ot           Medications, treatments, and labs reviewed  Continue medications and treatments as listed in [facility]    Scribe Attestation  I, Ingris Bennett   attest that this documentation has been prepared under the direction and in the presence of NORMA Cao.    Provider Attestation - Scribe documentation  All medical record entries made by the Scribe were at my direction and personally dictated by me. I have reviewed the chart and agree that the record accurately reflects my personal performance of the history, physical exam, discussion and plan.    NORMA Cao

## 2024-08-28 ENCOUNTER — NURSING HOME VISIT (OUTPATIENT)
Dept: POST ACUTE CARE | Facility: EXTERNAL LOCATION | Age: 69
End: 2024-08-28
Payer: MEDICARE

## 2024-08-28 DIAGNOSIS — G40.909 SEIZURE DISORDER (MULTI): ICD-10-CM

## 2024-08-28 DIAGNOSIS — I10 ESSENTIAL HYPERTENSION, BENIGN: ICD-10-CM

## 2024-08-28 DIAGNOSIS — R53.1 WEAKNESS: Primary | ICD-10-CM

## 2024-08-28 PROCEDURE — 99308 SBSQ NF CARE LOW MDM 20: CPT | Performed by: REGISTERED NURSE

## 2024-08-28 NOTE — LETTER
Patient: Vineet Lira  : 1955    Encounter Date: 2024    PROGRESS NOTE    Subjective  Chief complaint: Vineet Lira is a 69 y.o. male who is an acute skilled patient being seen and evaluated for weakness    HPI:  24 Patient admitted to SNF for therapy d/t weakness after recent hospitalization.   Patient requires assist with ADLs and transfers.  No new complaints.      24 Patient has no new complaints or concerns today.  Patient is working in therapy.  Denies n/v/f/c.    24 Therapy has been working with the patient to improve strength and endurance with ADLs, transfers, and mobility.  Patient continues to work toward goals.  Patient is stable and has no new complaints.  Nursing staff voices no new concerns today.    24 Patient seen and examined at bedside.  Patient denies n/v/f/c.  Continues working in therapy.  No new complaints.    24 Patient in therapy d/t generalized weakness.  Patient presents for f/u.  Continues to work toward goals in therapy.  No new complaints at this time.    Objective  Vital signs: 138/90, 97, 72, 14, 101.0, 95%    Physical Exam  Constitutional:       General: He is not in acute distress.  Eyes:      Extraocular Movements: Extraocular movements intact.   Pulmonary:      Effort: Pulmonary effort is normal.   Musculoskeletal:      Cervical back: Neck supple.   Neurological:      Mental Status: He is alert.   Psychiatric:         Mood and Affect: Mood normal.         Behavior: Behavior is cooperative.         Assessment/Plan  Problem List Items Addressed This Visit       Essential hypertension, benign     Continue current medications          Seizure disorder (Multi)     Continue current medications             Weakness - Primary     Pt/ot           Medications, treatments, and labs reviewed  Continue medications and treatments as listed in [facility]    Scribe Attestation  I, Ingris Bennett   attest that this documentation has been prepared under the  direction and in the presence of NORMA Cao.    Provider Attestation - Scribe documentation  All medical record entries made by the Scribe were at my direction and personally dictated by me. I have reviewed the chart and agree that the record accurately reflects my personal performance of the history, physical exam, discussion and plan.    NORMA Cao        Electronically Signed By: NORMA Cao   9/4/24  8:02 PM

## 2024-08-29 PROBLEM — R53.1 WEAKNESS: Status: ACTIVE | Noted: 2024-08-29

## 2024-08-29 PROBLEM — R00.2 PALPITATIONS: Status: ACTIVE | Noted: 2024-08-29

## 2024-08-29 NOTE — PROGRESS NOTES
PROGRESS NOTE    Subjective   Chief complaint: Vineet Lira is a 69 y.o. male who is an acute skilled patient being seen and evaluated for weakness    HPI:  8/21/24 Patient seen and examined at [facility].  Therapy has been working with the patient to improve strength, endurance, ADLs, and transfers d/t generalized weakness.  Patient has no acute concerns today.    Objective   Vital signs: 138/90, 97, 72, 14, 101.0, 95%    Physical Exam  Constitutional:       General: He is not in acute distress.  Eyes:      Extraocular Movements: Extraocular movements intact.   Pulmonary:      Effort: Pulmonary effort is normal.   Musculoskeletal:      Cervical back: Neck supple.   Neurological:      Mental Status: He is alert.   Psychiatric:         Mood and Affect: Mood normal.         Behavior: Behavior is cooperative.         Assessment/Plan   Problem List Items Addressed This Visit       Benign essential tremor     Continue current medications          Essential hypertension, benign     Continue current medications          Seizure disorder (Multi)     Continue current medications             Weakness     Pt/ot          Palpitations - Primary     Pt c/o chest palpitations received beta blocker palpitations resolved   Monitor   If occurs again notify MD/CNP and obtain EKG           Medications, treatments, and labs reviewed  Continue medications and treatments as listed in [facility]    Scribe Attestation  I, Ingris Bennett   attest that this documentation has been prepared under the direction and in the presence of NORMA Cao.    Provider Attestation - Scribe documentation  All medical record entries made by the Scribe were at my direction and personally dictated by me. I have reviewed the chart and agree that the record accurately reflects my personal performance of the history, physical exam, discussion and plan.    NORMA Cao

## 2024-08-29 NOTE — PROGRESS NOTES
PROGRESS NOTE    Subjective   Chief complaint: Vineet Lira is a 69 y.o. male who is an acute skilled patient being seen and evaluated for weakness    HPI:  8/20/24 Patient admitted to SNF for therapy d/t weakness after recent hospitalization.   Patient requires assist with ADLs and transfers.  No new complaints.      8/23/24 Patient has no new complaints or concerns today.  Patient is working in therapy.  Denies n/v/f/c.    8/26/24 Therapy has been working with the patient to improve strength and endurance with ADLs, transfers, and mobility.  Patient continues to work toward goals.  Patient is stable and has no new complaints.  Nursing staff voices no new concerns today.    8/27/24 Patient seen and examined at bedside.  Patient denies n/v/f/c.  Continues working in therapy.  No new complaints.    Objective   Vital signs: 138/90, 97, 72, 14, 101.0, 95%    Physical Exam  Constitutional:       General: He is not in acute distress.  Eyes:      Extraocular Movements: Extraocular movements intact.   Pulmonary:      Effort: Pulmonary effort is normal.   Musculoskeletal:      Cervical back: Neck supple.   Neurological:      Mental Status: He is alert.   Psychiatric:         Mood and Affect: Mood normal.         Behavior: Behavior is cooperative.         Assessment/Plan   Problem List Items Addressed This Visit       Essential hypertension, benign     Continue current medications          Seizure disorder (Multi)     Continue current medications             Weakness - Primary     Pt/ot           Medications, treatments, and labs reviewed  Continue medications and treatments as listed in [facility]    Scribe Attestation  Julianna HRENANDEZ Scribe   attest that this documentation has been prepared under the direction and in the presence of NORMA Cao.    Provider Attestation - Scribe documentation  All medical record entries made by the Scribe were at my direction and personally dictated by me. I have reviewed  the chart and agree that the record accurately reflects my personal performance of the history, physical exam, discussion and plan.    Kyle Mena, APRN-CNP

## 2024-08-29 NOTE — PROGRESS NOTES
PROGRESS NOTE    Subjective   Chief complaint: Vineet Lira is a 69 y.o. male who is an acute skilled patient being seen and evaluated for weakness    HPI:  8/20/24 Patient admitted to SNF for therapy d/t weakness after recent hospitalization.   Patient requires assist with ADLs and transfers.  No new complaints.      8/23/24 Patient has no new complaints or concerns today.  Patient is working in therapy.  Denies n/v/f/c.    8/26/24 Therapy has been working with the patient to improve strength and endurance with ADLs, transfers, and mobility.  Patient continues to work toward goals.  Patient is stable and has no new complaints.  Nursing staff voices no new concerns today.    8/27/24 Patient seen and examined at bedside.  Patient denies n/v/f/c.  Continues working in therapy.  No new complaints.    8/28/24 Patient in therapy d/t generalized weakness.  Patient presents for f/u.  Continues to work toward goals in therapy.  No new complaints at this time.    Objective   Vital signs: 138/90, 97, 72, 14, 101.0, 95%    Physical Exam  Constitutional:       General: He is not in acute distress.  Eyes:      Extraocular Movements: Extraocular movements intact.   Pulmonary:      Effort: Pulmonary effort is normal.   Musculoskeletal:      Cervical back: Neck supple.   Neurological:      Mental Status: He is alert.   Psychiatric:         Mood and Affect: Mood normal.         Behavior: Behavior is cooperative.         Assessment/Plan   Problem List Items Addressed This Visit       Essential hypertension, benign     Continue current medications          Seizure disorder (Multi)     Continue current medications             Weakness - Primary     Pt/ot           Medications, treatments, and labs reviewed  Continue medications and treatments as listed in [facility]    Scribe Attestation  I, Ingris Bennett   attest that this documentation has been prepared under the direction and in the presence of Kyle Mena,  APRN-CNP.    Provider Attestation - Scribe documentation  All medical record entries made by the Scribe were at my direction and personally dictated by me. I have reviewed the chart and agree that the record accurately reflects my personal performance of the history, physical exam, discussion and plan.    Kyle Mena, APRN-CNP

## 2024-08-29 NOTE — ASSESSMENT & PLAN NOTE
Pt c/o chest palpitations received beta blocker palpitations resolved   Monitor   If occurs again notify MD/CNP and obtain EKG

## 2024-08-29 NOTE — PROGRESS NOTES
PROGRESS NOTE    Subjective   Chief complaint: Vineet Lira is a 69 y.o. male who is an acute skilled patient being seen and evaluated for weakness    HPI:  8/20/24 Patient admitted to SNF for therapy d/t weakness after recent hospitalization.   Patient requires assist with ADLs and transfers.  No new complaints.      8/23/24 Patient has no new complaints or concerns today.  Patient is working in therapy.  Denies n/v/f/c.    8/26/24 Therapy has been working with the patient to improve strength and endurance with ADLs, transfers, and mobility.  Patient continues to work toward goals.  Patient is stable and has no new complaints.  Nursing staff voices no new concerns today.    Objective   Vital signs: 138/90, 97, 72, 14, 101.0, 95%    Physical Exam  Constitutional:       General: He is not in acute distress.  Eyes:      Extraocular Movements: Extraocular movements intact.   Pulmonary:      Effort: Pulmonary effort is normal.   Musculoskeletal:      Cervical back: Neck supple.   Neurological:      Mental Status: He is alert.   Psychiatric:         Mood and Affect: Mood normal.         Behavior: Behavior is cooperative.         Assessment/Plan   Problem List Items Addressed This Visit       Essential hypertension, benign     Continue current medications          Seizure disorder (Multi)     Continue current medications             Weakness - Primary     Pt/ot           Medications, treatments, and labs reviewed  Continue medications and treatments as listed in [facility]    Scribe Attestation  IJulianna Scribe   attest that this documentation has been prepared under the direction and in the presence of NORMA Cao.    Provider Attestation - Scribe documentation  All medical record entries made by the Scribe were at my direction and personally dictated by me. I have reviewed the chart and agree that the record accurately reflects my personal performance of the history, physical exam, discussion  and plan.    Kyle Mena, APRN-CNP

## 2024-08-30 ENCOUNTER — NURSING HOME VISIT (OUTPATIENT)
Dept: POST ACUTE CARE | Facility: EXTERNAL LOCATION | Age: 69
End: 2024-08-30
Payer: MEDICARE

## 2024-08-30 DIAGNOSIS — G40.209 PARTIAL SYMPTOMATIC EPILEPSY WITH COMPLEX PARTIAL SEIZURES, NOT INTRACTABLE, WITHOUT STATUS EPILEPTICUS (MULTI): ICD-10-CM

## 2024-08-30 DIAGNOSIS — R53.1 WEAKNESS: Primary | ICD-10-CM

## 2024-08-30 DIAGNOSIS — I10 ESSENTIAL HYPERTENSION, BENIGN: ICD-10-CM

## 2024-08-30 PROCEDURE — 99308 SBSQ NF CARE LOW MDM 20: CPT | Performed by: INTERNAL MEDICINE

## 2024-08-30 NOTE — LETTER
Patient: Vineet Lira  : 1955    Encounter Date: 2024    PROGRESS NOTE    Subjective  Chief complaint: Vineet Lira is a 69 y.o. male who is an acute skilled patient being seen and evaluated for weakness    HPI:  24 Patient admitted to SNF for therapy d/t weakness after recent hospitalization.   Patient requires assist with ADLs and transfers.  No new complaints.      24 Patient has no new complaints or concerns today.  Patient is working in therapy.  Denies n/v/f/c.    24 Therapy has been working with the patient to improve strength and endurance with ADLs, transfers, and mobility.  Patient continues to work toward goals.  Patient is stable and has no new complaints.  Nursing staff voices no new concerns today.    24 Patient seen and examined at bedside.  Patient denies n/v/f/c.  Continues working in therapy.  No new complaints.    24 Patient in therapy d/t generalized weakness.  Patient presents for f/u.  Continues to work toward goals in therapy.  No new complaints at this time.    2024 Patient has been working in therapy to improve strength, endurance, and ADLs.  Patient continues to work toward goals.  No new concerns today.  Denies n/v/f/c pain.          Objective  Vital signs: 140/80, 97.2, 73, 18, 95%    Physical Exam  Constitutional:       General: He is not in acute distress.  Eyes:      Extraocular Movements: Extraocular movements intact.   Cardiovascular:      Rate and Rhythm: Normal rate and regular rhythm.   Pulmonary:      Effort: Pulmonary effort is normal.      Breath sounds: Normal breath sounds.   Abdominal:      General: Bowel sounds are normal.      Palpations: Abdomen is soft.   Musculoskeletal:      Cervical back: Neck supple.      Right lower leg: No edema.      Left lower leg: No edema.   Neurological:      Mental Status: He is alert.      Motor: Weakness present.   Psychiatric:         Mood and Affect: Mood normal.          Assessment/Plan  Problem List Items Addressed This Visit       Essential hypertension, benign     Continue current medications          Partial symptomatic epilepsy with complex partial seizures, not intractable, without status epilepticus (Multi)     Continue current medications            Weakness - Primary     Pt/ot           Medications, treatments, and labs reviewed  Continue medications and treatments as listed in EMR      Scribe Attestation  ILiang Scribe   attest that this documentation has been prepared under the direction and in the presence of Kyle Jackson DO    Provider Attestation - Scribe documentation  All medical record entries made by the Scribe were at my direction and personally dictated by me. I have reviewed the chart and agree that the record accurately reflects my personal performance of the history, physical exam, discussion and plan.   Kyle Jackson DO        Electronically Signed By: Kyle Jackson DO   12/29/24  9:46 AM

## 2024-09-03 ENCOUNTER — NURSING HOME VISIT (OUTPATIENT)
Dept: POST ACUTE CARE | Facility: EXTERNAL LOCATION | Age: 69
End: 2024-09-03
Payer: MEDICARE

## 2024-09-03 DIAGNOSIS — I10 ESSENTIAL HYPERTENSION, BENIGN: ICD-10-CM

## 2024-09-03 DIAGNOSIS — R53.1 WEAKNESS: Primary | ICD-10-CM

## 2024-09-03 DIAGNOSIS — G40.909 SEIZURE DISORDER (MULTI): ICD-10-CM

## 2024-09-03 DIAGNOSIS — G40.209 PARTIAL SYMPTOMATIC EPILEPSY WITH COMPLEX PARTIAL SEIZURES, NOT INTRACTABLE, WITHOUT STATUS EPILEPTICUS (MULTI): ICD-10-CM

## 2024-09-03 PROCEDURE — 99308 SBSQ NF CARE LOW MDM 20: CPT | Performed by: REGISTERED NURSE

## 2024-09-03 NOTE — LETTER
Patient: Vineet Lira  : 1955    Encounter Date: 2024    PROGRESS NOTE    Subjective  Chief complaint: Vineet Lira is a 69 y.o. male who is an acute skilled patient being seen and evaluated for weakness    HPI:  24 Patient admitted to SNF for therapy d/t weakness after recent hospitalization.   Patient requires assist with ADLs and transfers.  No new complaints.      24 Patient has no new complaints or concerns today.  Patient is working in therapy.  Denies n/v/f/c.    24 Therapy has been working with the patient to improve strength and endurance with ADLs, transfers, and mobility.  Patient continues to work toward goals.  Patient is stable and has no new complaints.  Nursing staff voices no new concerns today.    24 Patient seen and examined at bedside.  Patient denies n/v/f/c.  Continues working in therapy.  No new complaints.    24 Patient in therapy d/t generalized weakness.  Patient presents for f/u.  Continues to work toward goals in therapy.  No new complaints at this time.    9/3/24 Patient has no new complaints or concerns today.  Continues working towards goals in therapy.  Denies constitutional symptoms.    Objective  Vital signs: 126/74, 97.5, 78, 18, 220.0, 96%    Physical Exam  Constitutional:       General: He is not in acute distress.  Eyes:      Extraocular Movements: Extraocular movements intact.   Pulmonary:      Effort: Pulmonary effort is normal.   Musculoskeletal:      Cervical back: Neck supple.   Neurological:      Mental Status: He is alert.   Psychiatric:         Mood and Affect: Mood normal.         Behavior: Behavior is cooperative.         Assessment/Plan  Problem List Items Addressed This Visit       Essential hypertension, benign     Continue current medications          Partial symptomatic epilepsy with complex partial seizures, not intractable, without status epilepticus (Multi)     Continue current medications            Seizure disorder  (Multi)     Continue current medications             Weakness - Primary     Pt/ot           Medications, treatments, and labs reviewed  Continue medications and treatments as listed in [facility]    Scribe Attestation  I, Ingris Bennett   attest that this documentation has been prepared under the direction and in the presence of NORMA Cao.    Provider Attestation - Scribe documentation  All medical record entries made by the Scribe were at my direction and personally dictated by me. I have reviewed the chart and agree that the record accurately reflects my personal performance of the history, physical exam, discussion and plan.    NORMA Cao        Electronically Signed By: NORMA Cao   9/10/24  7:42 PM

## 2024-09-04 ENCOUNTER — NURSING HOME VISIT (OUTPATIENT)
Dept: POST ACUTE CARE | Facility: EXTERNAL LOCATION | Age: 69
End: 2024-09-04
Payer: MEDICARE

## 2024-09-04 DIAGNOSIS — R53.1 WEAKNESS: Primary | ICD-10-CM

## 2024-09-04 DIAGNOSIS — G40.909 SEIZURE DISORDER (MULTI): ICD-10-CM

## 2024-09-04 DIAGNOSIS — G40.209 PARTIAL SYMPTOMATIC EPILEPSY WITH COMPLEX PARTIAL SEIZURES, NOT INTRACTABLE, WITHOUT STATUS EPILEPTICUS (MULTI): ICD-10-CM

## 2024-09-04 DIAGNOSIS — I10 ESSENTIAL HYPERTENSION, BENIGN: ICD-10-CM

## 2024-09-04 PROCEDURE — 99308 SBSQ NF CARE LOW MDM 20: CPT | Performed by: REGISTERED NURSE

## 2024-09-04 NOTE — LETTER
Patient: Vineet Lira  : 1955    Encounter Date: 2024    PROGRESS NOTE    Subjective  Chief complaint: Vineet Lira is a 69 y.o. male who is an acute skilled patient being seen and evaluated for weakness    HPI:  24 Patient has no new complaints or concerns today.  Patient is working in therapy.  Denies n/v/f/c.    Objective  Vital signs: 126/74, 97.5, 78, 18, 220.0, 96%    Physical Exam  Constitutional:       General: He is not in acute distress.  Eyes:      Extraocular Movements: Extraocular movements intact.   Pulmonary:      Effort: Pulmonary effort is normal.   Musculoskeletal:      Cervical back: Neck supple.   Neurological:      Mental Status: He is alert.   Psychiatric:         Mood and Affect: Mood normal.         Behavior: Behavior is cooperative.         Assessment/Plan  Problem List Items Addressed This Visit       Essential hypertension, benign     Continue current medications          Partial symptomatic epilepsy with complex partial seizures, not intractable, without status epilepticus (Multi)     Continue current medications            Seizure disorder (Multi)     Continue current medications             Weakness - Primary     Pt/ot           Medications, treatments, and labs reviewed  Continue medications and treatments as listed in [facility]    Scribe Attestation  I, Ingris Bennett   attest that this documentation has been prepared under the direction and in the presence of NORMA Cao.    Provider Attestation - Scribe documentation  All medical record entries made by the Scribe were at my direction and personally dictated by me. I have reviewed the chart and agree that the record accurately reflects my personal performance of the history, physical exam, discussion and plan.    NORMA Cao        Electronically Signed By: NORMA Cao   24  8:57 PM

## 2024-09-06 ENCOUNTER — NURSING HOME VISIT (OUTPATIENT)
Dept: POST ACUTE CARE | Facility: EXTERNAL LOCATION | Age: 69
End: 2024-09-06
Payer: MEDICARE

## 2024-09-06 DIAGNOSIS — G25.0 BENIGN ESSENTIAL TREMOR: ICD-10-CM

## 2024-09-06 DIAGNOSIS — R53.1 WEAKNESS: Primary | ICD-10-CM

## 2024-09-06 DIAGNOSIS — G40.909 SEIZURE DISORDER (MULTI): ICD-10-CM

## 2024-09-06 DIAGNOSIS — I10 ESSENTIAL HYPERTENSION, BENIGN: ICD-10-CM

## 2024-09-06 PROCEDURE — 99308 SBSQ NF CARE LOW MDM 20: CPT | Performed by: REGISTERED NURSE

## 2024-09-06 NOTE — LETTER
Patient: Vineet Lira  : 1955    Encounter Date: 2024    PROGRESS NOTE    Subjective  Chief complaint: Vineet Lria is a 69 y.o. male who is an acute skilled patient being seen and evaluated for weakness    HPI:  24 Patient has no new complaints or concerns today.  Patient is working in therapy.  Denies n/v/f/c.    24 Patient in therapy d/t generalized weakness.  Patient presents for f/u.  Continues to work toward goals in therapy.  No new complaints at this time.    Objective  Vital signs: 126/74, 97.5, 78, 18, 220.0, 96%    Physical Exam  Constitutional:       General: He is not in acute distress.  Eyes:      Extraocular Movements: Extraocular movements intact.   Pulmonary:      Effort: Pulmonary effort is normal.   Musculoskeletal:      Cervical back: Neck supple.   Neurological:      Mental Status: He is alert.   Psychiatric:         Mood and Affect: Mood normal.         Behavior: Behavior is cooperative.         Assessment/Plan  Problem List Items Addressed This Visit       Benign essential tremor     Continue current medications          Essential hypertension, benign     Continue current medications          Seizure disorder (Multi)     Continue current medications             Weakness - Primary     Pt/ot           Medications, treatments, and labs reviewed  Continue medications and treatments as listed in [facility]    Scribe Attestation  IJulianna Scribe   attest that this documentation has been prepared under the direction and in the presence of NORMA Cao.    Provider Attestation - Scribe documentation  All medical record entries made by the Scribe were at my direction and personally dictated by me. I have reviewed the chart and agree that the record accurately reflects my personal performance of the history, physical exam, discussion and plan.    NORMA Cao        Electronically Signed By: NORMA Cao   24   8:50 AM

## 2024-09-09 ENCOUNTER — NURSING HOME VISIT (OUTPATIENT)
Dept: POST ACUTE CARE | Facility: EXTERNAL LOCATION | Age: 69
End: 2024-09-09
Payer: MEDICARE

## 2024-09-09 DIAGNOSIS — I10 ESSENTIAL HYPERTENSION, BENIGN: ICD-10-CM

## 2024-09-09 DIAGNOSIS — G40.209 PARTIAL SYMPTOMATIC EPILEPSY WITH COMPLEX PARTIAL SEIZURES, NOT INTRACTABLE, WITHOUT STATUS EPILEPTICUS (MULTI): ICD-10-CM

## 2024-09-09 DIAGNOSIS — G40.909 SEIZURE DISORDER (MULTI): ICD-10-CM

## 2024-09-09 DIAGNOSIS — R53.1 WEAKNESS: Primary | ICD-10-CM

## 2024-09-09 PROCEDURE — 99308 SBSQ NF CARE LOW MDM 20: CPT | Performed by: REGISTERED NURSE

## 2024-09-09 NOTE — LETTER
Patient: Vineet Lira  : 1955    Encounter Date: 2024    PROGRESS NOTE    Subjective  Chief complaint: Vineet Lira is a 69 y.o. male who is an acute skilled patient being seen and evaluated for weakness    HPI:  24 Patient has no new complaints or concerns today.  Patient is working in therapy.  Denies n/v/f/c.    24 Patient in therapy d/t generalized weakness.  Patient presents for f/u.  Continues to work toward goals in therapy.  No new complaints at this time.    24 Patient has been working in therapy to improve strength, endurance, and ADLs.  Patient continues to work toward goals.  No new concerns today.  Denies n/v/f/c pain.      Objective  Vital signs: 126/74, 97.5, 78, 18, 220.0, 96%    Physical Exam  Constitutional:       General: He is not in acute distress.  Eyes:      Extraocular Movements: Extraocular movements intact.   Pulmonary:      Effort: Pulmonary effort is normal.   Musculoskeletal:      Cervical back: Neck supple.   Neurological:      Mental Status: He is alert.   Psychiatric:         Mood and Affect: Mood normal.         Behavior: Behavior is cooperative.         Assessment/Plan  Problem List Items Addressed This Visit       Essential hypertension, benign     Continue current medications          Partial symptomatic epilepsy with complex partial seizures, not intractable, without status epilepticus (Multi)     Continue current medications            Seizure disorder (Multi)     Continue current medications             Weakness - Primary     Pt/ot           Medications, treatments, and labs reviewed  Continue medications and treatments as listed in [facility]    Scribe Attestation  I, Ingris Bennett   attest that this documentation has been prepared under the direction and in the presence of NORMA Cao.    Provider Attestation - Scribe documentation  All medical record entries made by the Scribe were at my direction and personally dictated by  me. I have reviewed the chart and agree that the record accurately reflects my personal performance of the history, physical exam, discussion and plan.    NORMA Cao        Electronically Signed By: NORMA Cao   9/16/24  5:31 PM

## 2024-09-10 ENCOUNTER — NURSING HOME VISIT (OUTPATIENT)
Dept: POST ACUTE CARE | Facility: EXTERNAL LOCATION | Age: 69
End: 2024-09-10
Payer: MEDICARE

## 2024-09-10 DIAGNOSIS — G40.909 SEIZURE DISORDER (MULTI): ICD-10-CM

## 2024-09-10 DIAGNOSIS — G25.0 BENIGN ESSENTIAL TREMOR: ICD-10-CM

## 2024-09-10 DIAGNOSIS — R53.1 WEAKNESS: Primary | ICD-10-CM

## 2024-09-10 DIAGNOSIS — I10 ESSENTIAL HYPERTENSION, BENIGN: ICD-10-CM

## 2024-09-10 PROCEDURE — 99308 SBSQ NF CARE LOW MDM 20: CPT | Performed by: REGISTERED NURSE

## 2024-09-10 NOTE — PROGRESS NOTES
PROGRESS NOTE    Subjective   Chief complaint: Vineet Lira is a 69 y.o. male who is an acute skilled patient being seen and evaluated for weakness    HPI:  9/4/24 Patient has no new complaints or concerns today.  Patient is working in therapy.  Denies n/v/f/c.    9/6/24 Patient in therapy d/t generalized weakness.  Patient presents for f/u.  Continues to work toward goals in therapy.  No new complaints at this time.    Objective   Vital signs: 126/74, 97.5, 78, 18, 220.0, 96%    Physical Exam  Constitutional:       General: He is not in acute distress.  Eyes:      Extraocular Movements: Extraocular movements intact.   Pulmonary:      Effort: Pulmonary effort is normal.   Musculoskeletal:      Cervical back: Neck supple.   Neurological:      Mental Status: He is alert.   Psychiatric:         Mood and Affect: Mood normal.         Behavior: Behavior is cooperative.         Assessment/Plan   Problem List Items Addressed This Visit       Benign essential tremor     Continue current medications          Essential hypertension, benign     Continue current medications          Seizure disorder (Multi)     Continue current medications             Weakness - Primary     Pt/ot           Medications, treatments, and labs reviewed  Continue medications and treatments as listed in [facility]    Scribe Attestation  I, Ingris Bennett   attest that this documentation has been prepared under the direction and in the presence of NORMA Cao.    Provider Attestation - Scribe documentation  All medical record entries made by the Scribe were at my direction and personally dictated by me. I have reviewed the chart and agree that the record accurately reflects my personal performance of the history, physical exam, discussion and plan.    NORMA Cao

## 2024-09-10 NOTE — PROGRESS NOTES
PROGRESS NOTE    Subjective   Chief complaint: Vineet Lira is a 69 y.o. male who is an acute skilled patient being seen and evaluated for weakness    HPI:  9/4/24 Patient has no new complaints or concerns today.  Patient is working in therapy.  Denies n/v/f/c.    Objective   Vital signs: 126/74, 97.5, 78, 18, 220.0, 96%    Physical Exam  Constitutional:       General: He is not in acute distress.  Eyes:      Extraocular Movements: Extraocular movements intact.   Pulmonary:      Effort: Pulmonary effort is normal.   Musculoskeletal:      Cervical back: Neck supple.   Neurological:      Mental Status: He is alert.   Psychiatric:         Mood and Affect: Mood normal.         Behavior: Behavior is cooperative.         Assessment/Plan   Problem List Items Addressed This Visit       Essential hypertension, benign     Continue current medications          Partial symptomatic epilepsy with complex partial seizures, not intractable, without status epilepticus (Multi)     Continue current medications            Seizure disorder (Multi)     Continue current medications             Weakness - Primary     Pt/ot           Medications, treatments, and labs reviewed  Continue medications and treatments as listed in [facility]    Scribe Attestation  IJulianna Scribe   attest that this documentation has been prepared under the direction and in the presence of NORMA Cao.    Provider Attestation - Scribe documentation  All medical record entries made by the Scribe were at my direction and personally dictated by me. I have reviewed the chart and agree that the record accurately reflects my personal performance of the history, physical exam, discussion and plan.    NORMA Cao

## 2024-09-10 NOTE — LETTER
Patient: Vineet Lira  : 1955    Encounter Date: 09/10/2024    PROGRESS NOTE    Subjective  Chief complaint: Vineet Lira is a 69 y.o. male who is an acute skilled patient being seen and evaluated for weakness    HPI:  24 Patient has no new complaints or concerns today.  Patient is working in therapy.  Denies n/v/f/c.    24 Patient in therapy d/t generalized weakness.  Patient presents for f/u.  Continues to work toward goals in therapy.  No new complaints at this time.    24 Patient has been working in therapy to improve strength, endurance, and ADLs.  Patient continues to work toward goals.  No new concerns today.  Denies n/v/f/c pain.      9/10/24 Therapy has been working with the patient to improve strength and endurance with ADLs, transfers, and mobility.  Patient continues to work toward goals.  Patient is stable and has no new complaints.  Nursing staff voices no new concerns today.    Objective  Vital signs: 126/74, 97.5, 78, 18, 220.0, 96%    Physical Exam  Constitutional:       General: He is not in acute distress.  Eyes:      Extraocular Movements: Extraocular movements intact.   Pulmonary:      Effort: Pulmonary effort is normal.   Musculoskeletal:      Cervical back: Neck supple.   Neurological:      Mental Status: He is alert.   Psychiatric:         Mood and Affect: Mood normal.         Behavior: Behavior is cooperative.         Assessment/Plan  Problem List Items Addressed This Visit       Benign essential tremor     Continue current medications          Essential hypertension, benign     Continue current medications          Seizure disorder (Multi)     Continue current medications             Weakness - Primary     Pt/ot           Medications, treatments, and labs reviewed  Continue medications and treatments as listed in [facility]    Scribe Attestation  I, Ingris Bennett   attest that this documentation has been prepared under the direction and in the presence of Kyle  NORMA Amaral.    Provider Attestation - Scribe documentation  All medical record entries made by the Scribe were at my direction and personally dictated by me. I have reviewed the chart and agree that the record accurately reflects my personal performance of the history, physical exam, discussion and plan.    NORMA Cao        Electronically Signed By: NORMA Cao   9/17/24  5:21 PM

## 2024-09-10 NOTE — PROGRESS NOTES
PROGRESS NOTE    Subjective   Chief complaint: Vineet Lira is a 69 y.o. male who is an acute skilled patient being seen and evaluated for weakness    HPI:  9/4/24 Patient has no new complaints or concerns today.  Patient is working in therapy.  Denies n/v/f/c.    9/6/24 Patient in therapy d/t generalized weakness.  Patient presents for f/u.  Continues to work toward goals in therapy.  No new complaints at this time.    9/9/24 Patient has been working in therapy to improve strength, endurance, and ADLs.  Patient continues to work toward goals.  No new concerns today.  Denies n/v/f/c pain.      Objective   Vital signs: 126/74, 97.5, 78, 18, 220.0, 96%    Physical Exam  Constitutional:       General: He is not in acute distress.  Eyes:      Extraocular Movements: Extraocular movements intact.   Pulmonary:      Effort: Pulmonary effort is normal.   Musculoskeletal:      Cervical back: Neck supple.   Neurological:      Mental Status: He is alert.   Psychiatric:         Mood and Affect: Mood normal.         Behavior: Behavior is cooperative.         Assessment/Plan   Problem List Items Addressed This Visit       Essential hypertension, benign     Continue current medications          Partial symptomatic epilepsy with complex partial seizures, not intractable, without status epilepticus (Multi)     Continue current medications            Seizure disorder (Multi)     Continue current medications             Weakness - Primary     Pt/ot           Medications, treatments, and labs reviewed  Continue medications and treatments as listed in [facility]    Scribe Attestation  IJulianna Scribe   attest that this documentation has been prepared under the direction and in the presence of NORMA Cao.    Provider Attestation - Scribe documentation  All medical record entries made by the Scribe were at my direction and personally dictated by me. I have reviewed the chart and agree that the record accurately  reflects my personal performance of the history, physical exam, discussion and plan.    Kyle Mena, APRN-CNP

## 2024-09-10 NOTE — PROGRESS NOTES
PROGRESS NOTE    Subjective   Chief complaint: Vineet Lira is a 69 y.o. male who is an acute skilled patient being seen and evaluated for weakness    HPI:  8/20/24 Patient admitted to SNF for therapy d/t weakness after recent hospitalization.   Patient requires assist with ADLs and transfers.  No new complaints.      8/23/24 Patient has no new complaints or concerns today.  Patient is working in therapy.  Denies n/v/f/c.    8/26/24 Therapy has been working with the patient to improve strength and endurance with ADLs, transfers, and mobility.  Patient continues to work toward goals.  Patient is stable and has no new complaints.  Nursing staff voices no new concerns today.    8/27/24 Patient seen and examined at bedside.  Patient denies n/v/f/c.  Continues working in therapy.  No new complaints.    8/28/24 Patient in therapy d/t generalized weakness.  Patient presents for f/u.  Continues to work toward goals in therapy.  No new complaints at this time.    9/3/24 Patient has no new complaints or concerns today.  Continues working towards goals in therapy.  Denies constitutional symptoms.    Objective   Vital signs: 126/74, 97.5, 78, 18, 220.0, 96%    Physical Exam  Constitutional:       General: He is not in acute distress.  Eyes:      Extraocular Movements: Extraocular movements intact.   Pulmonary:      Effort: Pulmonary effort is normal.   Musculoskeletal:      Cervical back: Neck supple.   Neurological:      Mental Status: He is alert.   Psychiatric:         Mood and Affect: Mood normal.         Behavior: Behavior is cooperative.         Assessment/Plan   Problem List Items Addressed This Visit       Essential hypertension, benign     Continue current medications          Partial symptomatic epilepsy with complex partial seizures, not intractable, without status epilepticus (Multi)     Continue current medications            Seizure disorder (Multi)     Continue current medications             Weakness - Primary      Pt/ot           Medications, treatments, and labs reviewed  Continue medications and treatments as listed in [facility]    Scribe Attestation  I, Ingris Bennett   attest that this documentation has been prepared under the direction and in the presence of NORMA Cao.    Provider Attestation - Scribe documentation  All medical record entries made by the Scribe were at my direction and personally dictated by me. I have reviewed the chart and agree that the record accurately reflects my personal performance of the history, physical exam, discussion and plan.    NORMA Cao

## 2024-09-11 ENCOUNTER — NURSING HOME VISIT (OUTPATIENT)
Dept: POST ACUTE CARE | Facility: EXTERNAL LOCATION | Age: 69
End: 2024-09-11
Payer: MEDICARE

## 2024-09-11 DIAGNOSIS — G40.209 PARTIAL SYMPTOMATIC EPILEPSY WITH COMPLEX PARTIAL SEIZURES, NOT INTRACTABLE, WITHOUT STATUS EPILEPTICUS (MULTI): ICD-10-CM

## 2024-09-11 DIAGNOSIS — G40.909 SEIZURE DISORDER (MULTI): Primary | ICD-10-CM

## 2024-09-11 DIAGNOSIS — I10 ESSENTIAL HYPERTENSION, BENIGN: ICD-10-CM

## 2024-09-11 DIAGNOSIS — R53.1 WEAKNESS: ICD-10-CM

## 2024-09-11 PROCEDURE — 99308 SBSQ NF CARE LOW MDM 20: CPT | Performed by: REGISTERED NURSE

## 2024-09-11 NOTE — LETTER
Patient: Vineet Lira  : 1955    Encounter Date: 2024    aPROGRESS NOTE    Subjective  Chief complaint: Vineet Lira is a 69 y.o. male who is an acute skilled patient being seen and evaluated for weakness    HPI:  24 Patient has no new complaints or concerns today.  Patient is working in therapy.  Denies n/v/f/c.    24 Patient in therapy d/t generalized weakness.  Patient presents for f/u.  Continues to work toward goals in therapy.  No new complaints at this time.    24 Patient has been working in therapy to improve strength, endurance, and ADLs.  Patient continues to work toward goals.  No new concerns today.  Denies n/v/f/c pain.      9/10/24 Therapy has been working with the patient to improve strength and endurance with ADLs, transfers, and mobility.  Patient continues to work toward goals.  Patient is stable and has no new complaints.  Nursing staff voices no new concerns today.    24 Patient has no new complaints or concerns today.  Continues working towards goals in therapy.  Denies constitutional symptoms.    Objective  Vital signs: 176/81, 96.8, 75, 20, 220.0, 96%    Physical Exam  Constitutional:       General: He is not in acute distress.  Eyes:      Extraocular Movements: Extraocular movements intact.   Pulmonary:      Effort: Pulmonary effort is normal.   Musculoskeletal:      Cervical back: Neck supple.   Neurological:      Mental Status: He is alert.   Psychiatric:         Mood and Affect: Mood normal.         Behavior: Behavior is cooperative.         Assessment/Plan  Problem List Items Addressed This Visit       Essential hypertension, benign     Continue current medications          Partial symptomatic epilepsy with complex partial seizures, not intractable, without status epilepticus (Multi)     Continue current medications            Seizure disorder (Multi) - Primary     Continue current medications             Weakness     Pt/ot           Medications,  treatments, and labs reviewed  Continue medications and treatments as listed in [facility]    Scribe Attestation  I, Ingris Bennett   attest that this documentation has been prepared under the direction and in the presence of NORMA Cao.    Provider Attestation - Scribe documentation  All medical record entries made by the Scribe were at my direction and personally dictated by me. I have reviewed the chart and agree that the record accurately reflects my personal performance of the history, physical exam, discussion and plan.    NORMA Cao        Electronically Signed By: NORMA Cao   9/18/24  6:06 PM

## 2024-09-11 NOTE — PROGRESS NOTES
PROGRESS NOTE    Subjective   Chief complaint: Vineet Lira is a 69 y.o. male who is an acute skilled patient being seen and evaluated for weakness    HPI:  9/4/24 Patient has no new complaints or concerns today.  Patient is working in therapy.  Denies n/v/f/c.    9/6/24 Patient in therapy d/t generalized weakness.  Patient presents for f/u.  Continues to work toward goals in therapy.  No new complaints at this time.    9/9/24 Patient has been working in therapy to improve strength, endurance, and ADLs.  Patient continues to work toward goals.  No new concerns today.  Denies n/v/f/c pain.      9/10/24 Therapy has been working with the patient to improve strength and endurance with ADLs, transfers, and mobility.  Patient continues to work toward goals.  Patient is stable and has no new complaints.  Nursing staff voices no new concerns today.    Objective   Vital signs: 126/74, 97.5, 78, 18, 220.0, 96%    Physical Exam  Constitutional:       General: He is not in acute distress.  Eyes:      Extraocular Movements: Extraocular movements intact.   Pulmonary:      Effort: Pulmonary effort is normal.   Musculoskeletal:      Cervical back: Neck supple.   Neurological:      Mental Status: He is alert.   Psychiatric:         Mood and Affect: Mood normal.         Behavior: Behavior is cooperative.         Assessment/Plan   Problem List Items Addressed This Visit       Benign essential tremor     Continue current medications          Essential hypertension, benign     Continue current medications          Seizure disorder (Multi)     Continue current medications             Weakness - Primary     Pt/ot           Medications, treatments, and labs reviewed  Continue medications and treatments as listed in [facility]    Scribe Attestation  I, Ingris Bennett   attest that this documentation has been prepared under the direction and in the presence of NORMA Cao.    Provider Attestation - Serjioibdiapk  documentation  All medical record entries made by the Scribe were at my direction and personally dictated by me. I have reviewed the chart and agree that the record accurately reflects my personal performance of the history, physical exam, discussion and plan.    Kyle Mena, APRN-CNP

## 2024-09-12 NOTE — PROGRESS NOTES
aPROGRESS NOTE    Subjective   Chief complaint: Vineet Lira is a 69 y.o. male who is an acute skilled patient being seen and evaluated for weakness    HPI:  9/4/24 Patient has no new complaints or concerns today.  Patient is working in therapy.  Denies n/v/f/c.    9/6/24 Patient in therapy d/t generalized weakness.  Patient presents for f/u.  Continues to work toward goals in therapy.  No new complaints at this time.    9/9/24 Patient has been working in therapy to improve strength, endurance, and ADLs.  Patient continues to work toward goals.  No new concerns today.  Denies n/v/f/c pain.      9/10/24 Therapy has been working with the patient to improve strength and endurance with ADLs, transfers, and mobility.  Patient continues to work toward goals.  Patient is stable and has no new complaints.  Nursing staff voices no new concerns today.    9/11/24 Patient has no new complaints or concerns today.  Continues working towards goals in therapy.  Denies constitutional symptoms.    Objective   Vital signs: 176/81, 96.8, 75, 20, 220.0, 96%    Physical Exam  Constitutional:       General: He is not in acute distress.  Eyes:      Extraocular Movements: Extraocular movements intact.   Pulmonary:      Effort: Pulmonary effort is normal.   Musculoskeletal:      Cervical back: Neck supple.   Neurological:      Mental Status: He is alert.   Psychiatric:         Mood and Affect: Mood normal.         Behavior: Behavior is cooperative.         Assessment/Plan   Problem List Items Addressed This Visit       Essential hypertension, benign     Continue current medications          Partial symptomatic epilepsy with complex partial seizures, not intractable, without status epilepticus (Multi)     Continue current medications            Seizure disorder (Multi) - Primary     Continue current medications             Weakness     Pt/ot           Medications, treatments, and labs reviewed  Continue medications and treatments as listed  in [facility]    Scribe Attestation  I, Ingris Bennett   attest that this documentation has been prepared under the direction and in the presence of NORMA Cao.    Provider Attestation - Scribe documentation  All medical record entries made by the Scribe were at my direction and personally dictated by me. I have reviewed the chart and agree that the record accurately reflects my personal performance of the history, physical exam, discussion and plan.    NORMA Cao

## 2024-09-13 ENCOUNTER — NURSING HOME VISIT (OUTPATIENT)
Dept: POST ACUTE CARE | Facility: EXTERNAL LOCATION | Age: 69
End: 2024-09-13
Payer: MEDICARE

## 2024-09-13 DIAGNOSIS — R53.1 WEAKNESS: Primary | ICD-10-CM

## 2024-09-13 DIAGNOSIS — G40.209 PARTIAL SYMPTOMATIC EPILEPSY WITH COMPLEX PARTIAL SEIZURES, NOT INTRACTABLE, WITHOUT STATUS EPILEPTICUS (MULTI): ICD-10-CM

## 2024-09-13 DIAGNOSIS — I10 ESSENTIAL HYPERTENSION, BENIGN: ICD-10-CM

## 2024-09-13 DIAGNOSIS — G40.909 SEIZURE DISORDER (MULTI): ICD-10-CM

## 2024-09-13 PROCEDURE — 99316 NF DSCHRG MGMT 30 MIN+: CPT | Performed by: REGISTERED NURSE

## 2024-09-13 NOTE — LETTER
Patient: Vineet Lira  : 1955    Encounter Date: 2024    PROGRESS NOTE    Subjective  Chief complaint: Vineet Lira is a 69 y.o. male who is an acute skilled patient being seen and evaluated for weakness    HPI:  Patient has completed therapy and will be discharging today and is planning to DC home. Patient is stable with no new nursing concerns reported. Denies n/v/f/c, pain.      Objective  Vital signs: 152/86, 96.8, 75, 20, 220.0, 96%    Physical Exam  Constitutional:       General: He is not in acute distress.  Eyes:      Extraocular Movements: Extraocular movements intact.   Pulmonary:      Effort: Pulmonary effort is normal.   Musculoskeletal:      Cervical back: Neck supple.   Neurological:      Mental Status: He is alert.   Psychiatric:         Mood and Affect: Mood normal.         Behavior: Behavior is cooperative.         Assessment/Plan  Problem List Items Addressed This Visit       Essential hypertension, benign     Continue current medications          Partial symptomatic epilepsy with complex partial seizures, not intractable, without status epilepticus (Multi)     Continue current medications            Seizure disorder (Multi)     Continue current medications             Weakness - Primary     Pt/ot           Medications, treatments, and labs reviewed  Continue medications and treatments as listed in [facility]    Scribe Attestation  I, Ingris Bennett   attest that this documentation has been prepared under the direction and in the presence of NORMA Cao.    Provider Attestation - Scribe documentation  All medical record entries made by the Scribe were at my direction and personally dictated by me. I have reviewed the chart and agree that the record accurately reflects my personal performance of the history, physical exam, discussion and plan.    NORMA Cao        Electronically Signed By: NORMA Cao   24  8:13  AM

## 2024-09-17 NOTE — PROGRESS NOTES
PROGRESS NOTE    Subjective   Chief complaint: Vineet Lira is a 69 y.o. male who is an acute skilled patient being seen and evaluated for weakness    HPI:  Patient has completed therapy and will be discharging today and is planning to DC home. Patient is stable with no new nursing concerns reported. Denies n/v/f/c, pain.      Objective   Vital signs: 152/86, 96.8, 75, 20, 220.0, 96%    Physical Exam  Constitutional:       General: He is not in acute distress.  Eyes:      Extraocular Movements: Extraocular movements intact.   Pulmonary:      Effort: Pulmonary effort is normal.   Musculoskeletal:      Cervical back: Neck supple.   Neurological:      Mental Status: He is alert.   Psychiatric:         Mood and Affect: Mood normal.         Behavior: Behavior is cooperative.         Assessment/Plan   Problem List Items Addressed This Visit       Essential hypertension, benign     Continue current medications          Partial symptomatic epilepsy with complex partial seizures, not intractable, without status epilepticus (Multi)     Continue current medications            Seizure disorder (Multi)     Continue current medications             Weakness - Primary     Pt/ot           Admitting dx  Discharge dx  Prognosis fair  Pt/ot completed   Dc home follow up pcp in 1 week     Medications, treatments, and labs reviewed  Continue medications and treatments as listed in [facility]    Scribe Attestation  I, Ingris Bennett   attest that this documentation has been prepared under the direction and in the presence of NORMA Cao.    Provider Attestation - Scribe documentation  All medical record entries made by the Scribe were at my direction and personally dictated by me. I have reviewed the chart and agree that the record accurately reflects my personal performance of the history, physical exam, discussion and plan.    NORMA Cao

## 2024-11-24 NOTE — PROGRESS NOTES
PROGRESS NOTE    Subjective   Chief complaint: Vineet Lira is a 69 y.o. male who is an acute skilled patient being seen and evaluated for weakness    HPI:  8/20/24 Patient admitted to SNF for therapy d/t weakness after recent hospitalization.   Patient requires assist with ADLs and transfers.  No new complaints.      8/23/24 Patient has no new complaints or concerns today.  Patient is working in therapy.  Denies n/v/f/c.    8/26/24 Therapy has been working with the patient to improve strength and endurance with ADLs, transfers, and mobility.  Patient continues to work toward goals.  Patient is stable and has no new complaints.  Nursing staff voices no new concerns today.    8/27/24 Patient seen and examined at bedside.  Patient denies n/v/f/c.  Continues working in therapy.  No new complaints.    8/28/24 Patient in therapy d/t generalized weakness.  Patient presents for f/u.  Continues to work toward goals in therapy.  No new complaints at this time.    8/30/2024 Patient has been working in therapy to improve strength, endurance, and ADLs.  Patient continues to work toward goals.  No new concerns today.  Denies n/v/f/c pain.          Objective   Vital signs: 140/80, 97.2, 73, 18, 95%    Physical Exam  Constitutional:       General: He is not in acute distress.  Eyes:      Extraocular Movements: Extraocular movements intact.   Cardiovascular:      Rate and Rhythm: Normal rate and regular rhythm.   Pulmonary:      Effort: Pulmonary effort is normal.      Breath sounds: Normal breath sounds.   Abdominal:      General: Bowel sounds are normal.      Palpations: Abdomen is soft.   Musculoskeletal:      Cervical back: Neck supple.      Right lower leg: No edema.      Left lower leg: No edema.   Neurological:      Mental Status: He is alert.      Motor: Weakness present.   Psychiatric:         Mood and Affect: Mood normal.         Assessment/Plan   Problem List Items Addressed This Visit       Essential hypertension,  benign     Continue current medications          Partial symptomatic epilepsy with complex partial seizures, not intractable, without status epilepticus (Multi)     Continue current medications            Weakness - Primary     Pt/ot           Medications, treatments, and labs reviewed  Continue medications and treatments as listed in EMR      Scribe Attestation  ILiang Scribe   attest that this documentation has been prepared under the direction and in the presence of Kyle Jackson DO    Provider Attestation - Scribe documentation  All medical record entries made by the Scribe were at my direction and personally dictated by me. I have reviewed the chart and agree that the record accurately reflects my personal performance of the history, physical exam, discussion and plan.   Kyle Jackson DO       Yes

## 2024-11-25 DIAGNOSIS — G40.209 PARTIAL SYMPTOMATIC EPILEPSY WITH COMPLEX PARTIAL SEIZURES, NOT INTRACTABLE, WITHOUT STATUS EPILEPTICUS (MULTI): ICD-10-CM

## 2024-11-25 RX ORDER — PHENOBARBITAL 32.4 MG/1
TABLET ORAL
Qty: 270 TABLET | Refills: 1 | Status: SHIPPED | OUTPATIENT
Start: 2024-11-25

## 2025-02-05 ENCOUNTER — HOSPITAL ENCOUNTER (INPATIENT)
Facility: HOSPITAL | Age: 70
LOS: 3 days | Discharge: NURSING, REHAB OR LONG-TERM CARE | End: 2025-02-08
Attending: STUDENT IN AN ORGANIZED HEALTH CARE EDUCATION/TRAINING PROGRAM | Admitting: PHYSICIAN ASSISTANT
Payer: MEDICARE

## 2025-02-05 ENCOUNTER — APPOINTMENT (OUTPATIENT)
Dept: RADIOLOGY | Facility: HOSPITAL | Age: 70
End: 2025-02-05
Payer: MEDICARE

## 2025-02-05 ENCOUNTER — APPOINTMENT (OUTPATIENT)
Dept: CARDIOLOGY | Facility: HOSPITAL | Age: 70
End: 2025-02-05
Payer: MEDICARE

## 2025-02-05 DIAGNOSIS — G25.0 BENIGN ESSENTIAL TREMOR: ICD-10-CM

## 2025-02-05 DIAGNOSIS — N17.9 AKI (ACUTE KIDNEY INJURY) (CMS-HCC): Primary | ICD-10-CM

## 2025-02-05 DIAGNOSIS — R33.9 URINARY RETENTION: ICD-10-CM

## 2025-02-05 DIAGNOSIS — G40.209 PARTIAL SYMPTOMATIC EPILEPSY WITH COMPLEX PARTIAL SEIZURES, NOT INTRACTABLE, WITHOUT STATUS EPILEPTICUS (MULTI): ICD-10-CM

## 2025-02-05 LAB
ALBUMIN SERPL BCP-MCNC: 4 G/DL (ref 3.4–5)
ALP SERPL-CCNC: 87 U/L (ref 33–136)
ALT SERPL W P-5'-P-CCNC: 14 U/L (ref 10–52)
ANION GAP SERPL CALC-SCNC: 22 MMOL/L (ref 10–20)
APPEARANCE UR: ABNORMAL
AST SERPL W P-5'-P-CCNC: 14 U/L (ref 9–39)
BACTERIA #/AREA URNS AUTO: ABNORMAL /HPF
BASOPHILS # BLD AUTO: 0.03 X10*3/UL (ref 0–0.1)
BASOPHILS NFR BLD AUTO: 0.3 %
BILIRUB SERPL-MCNC: 0.4 MG/DL (ref 0–1.2)
BILIRUB UR STRIP.AUTO-MCNC: NEGATIVE MG/DL
BNP SERPL-MCNC: 23 PG/ML (ref 0–99)
BUN SERPL-MCNC: 94 MG/DL (ref 6–23)
CALCIUM SERPL-MCNC: 9.3 MG/DL (ref 8.6–10.3)
CARDIAC TROPONIN I PNL SERPL HS: 11 NG/L (ref 0–20)
CARDIAC TROPONIN I PNL SERPL HS: 12 NG/L (ref 0–20)
CHLORIDE SERPL-SCNC: 90 MMOL/L (ref 98–107)
CK SERPL-CCNC: 15 U/L (ref 0–325)
CO2 SERPL-SCNC: 24 MMOL/L (ref 21–32)
COLOR UR: YELLOW
CREAT SERPL-MCNC: 6.36 MG/DL (ref 0.5–1.3)
CREAT UR-MCNC: 298 MG/DL (ref 20–370)
EGFRCR SERPLBLD CKD-EPI 2021: 9 ML/MIN/1.73M*2
EOSINOPHIL # BLD AUTO: 0.06 X10*3/UL (ref 0–0.7)
EOSINOPHIL NFR BLD AUTO: 0.5 %
ERYTHROCYTE [DISTWIDTH] IN BLOOD BY AUTOMATED COUNT: 12.9 % (ref 11.5–14.5)
GLUCOSE BLD MANUAL STRIP-MCNC: 118 MG/DL (ref 74–99)
GLUCOSE BLD MANUAL STRIP-MCNC: 155 MG/DL (ref 74–99)
GLUCOSE SERPL-MCNC: 160 MG/DL (ref 74–99)
GLUCOSE UR STRIP.AUTO-MCNC: ABNORMAL MG/DL
GRAN CASTS #/AREA UR COMP ASSIST: ABNORMAL /LPF
HCT VFR BLD AUTO: 39.4 % (ref 41–52)
HGB BLD-MCNC: 13.6 G/DL (ref 13.5–17.5)
HOLD SPECIMEN: NORMAL
HYALINE CASTS #/AREA URNS AUTO: ABNORMAL /LPF
IMM GRANULOCYTES # BLD AUTO: 0.09 X10*3/UL (ref 0–0.7)
IMM GRANULOCYTES NFR BLD AUTO: 0.8 % (ref 0–0.9)
KETONES UR STRIP.AUTO-MCNC: NEGATIVE MG/DL
LACTATE SERPL-SCNC: 1.3 MMOL/L (ref 0.4–2)
LEUKOCYTE ESTERASE UR QL STRIP.AUTO: NEGATIVE
LYMPHOCYTES # BLD AUTO: 1.38 X10*3/UL (ref 1.2–4.8)
LYMPHOCYTES NFR BLD AUTO: 12.2 %
MAGNESIUM SERPL-MCNC: 2.36 MG/DL (ref 1.6–2.4)
MCH RBC QN AUTO: 32.6 PG (ref 26–34)
MCHC RBC AUTO-ENTMCNC: 34.5 G/DL (ref 32–36)
MCV RBC AUTO: 95 FL (ref 80–100)
MONOCYTES # BLD AUTO: 1.02 X10*3/UL (ref 0.1–1)
MONOCYTES NFR BLD AUTO: 9 %
MUCOUS THREADS #/AREA URNS AUTO: ABNORMAL /LPF
NEUTROPHILS # BLD AUTO: 8.75 X10*3/UL (ref 1.2–7.7)
NEUTROPHILS NFR BLD AUTO: 77.2 %
NITRITE UR QL STRIP.AUTO: NEGATIVE
NRBC BLD-RTO: 0 /100 WBCS (ref 0–0)
OSMOLALITY UR: 322 MOSM/KG (ref 200–1200)
PH UR STRIP.AUTO: 5 [PH]
PLATELET # BLD AUTO: 144 X10*3/UL (ref 150–450)
POTASSIUM SERPL-SCNC: 4.3 MMOL/L (ref 3.5–5.3)
PROT SERPL-MCNC: 7.8 G/DL (ref 6.4–8.2)
PROT UR STRIP.AUTO-MCNC: ABNORMAL MG/DL
RBC # BLD AUTO: 4.17 X10*6/UL (ref 4.5–5.9)
RBC # UR STRIP.AUTO: ABNORMAL MG/DL
RBC #/AREA URNS AUTO: ABNORMAL /HPF
SODIUM SERPL-SCNC: 132 MMOL/L (ref 136–145)
SODIUM UR-SCNC: 12 MMOL/L
SODIUM/CREAT UR-RTO: 4 MMOL/G CREAT
SP GR UR STRIP.AUTO: 1.02
UROBILINOGEN UR STRIP.AUTO-MCNC: NORMAL MG/DL
VALPROATE SERPL-MCNC: 24 UG/ML (ref 50–100)
WBC # BLD AUTO: 11.3 X10*3/UL (ref 4.4–11.3)
WBC #/AREA URNS AUTO: ABNORMAL /HPF
WBC CLUMPS #/AREA URNS AUTO: ABNORMAL /HPF

## 2025-02-05 PROCEDURE — 82550 ASSAY OF CK (CPK): CPT | Performed by: PHYSICIAN ASSISTANT

## 2025-02-05 PROCEDURE — 76770 US EXAM ABDO BACK WALL COMP: CPT

## 2025-02-05 PROCEDURE — 36415 COLL VENOUS BLD VENIPUNCTURE: CPT | Performed by: PHYSICIAN ASSISTANT

## 2025-02-05 PROCEDURE — 71045 X-RAY EXAM CHEST 1 VIEW: CPT

## 2025-02-05 PROCEDURE — 81001 URINALYSIS AUTO W/SCOPE: CPT | Performed by: PHYSICIAN ASSISTANT

## 2025-02-05 PROCEDURE — 87040 BLOOD CULTURE FOR BACTERIA: CPT | Mod: PARLAB | Performed by: PHYSICIAN ASSISTANT

## 2025-02-05 PROCEDURE — 51702 INSERT TEMP BLADDER CATH: CPT

## 2025-02-05 PROCEDURE — 93005 ELECTROCARDIOGRAM TRACING: CPT

## 2025-02-05 PROCEDURE — 73502 X-RAY EXAM HIP UNI 2-3 VIEWS: CPT | Mod: LT

## 2025-02-05 PROCEDURE — 99285 EMERGENCY DEPT VISIT HI MDM: CPT | Mod: 25 | Performed by: STUDENT IN AN ORGANIZED HEALTH CARE EDUCATION/TRAINING PROGRAM

## 2025-02-05 PROCEDURE — 83605 ASSAY OF LACTIC ACID: CPT | Performed by: PHYSICIAN ASSISTANT

## 2025-02-05 PROCEDURE — 76770 US EXAM ABDO BACK WALL COMP: CPT | Performed by: RADIOLOGY

## 2025-02-05 PROCEDURE — 99221 1ST HOSP IP/OBS SF/LOW 40: CPT | Performed by: ORTHOPAEDIC SURGERY

## 2025-02-05 PROCEDURE — 82570 ASSAY OF URINE CREATININE: CPT | Performed by: PHYSICIAN ASSISTANT

## 2025-02-05 PROCEDURE — 73502 X-RAY EXAM HIP UNI 2-3 VIEWS: CPT | Mod: LEFT SIDE | Performed by: STUDENT IN AN ORGANIZED HEALTH CARE EDUCATION/TRAINING PROGRAM

## 2025-02-05 PROCEDURE — 83735 ASSAY OF MAGNESIUM: CPT | Performed by: PHYSICIAN ASSISTANT

## 2025-02-05 PROCEDURE — 80053 COMPREHEN METABOLIC PANEL: CPT | Performed by: PHYSICIAN ASSISTANT

## 2025-02-05 PROCEDURE — 87086 URINE CULTURE/COLONY COUNT: CPT | Mod: PARLAB | Performed by: PHYSICIAN ASSISTANT

## 2025-02-05 PROCEDURE — 2500000001 HC RX 250 WO HCPCS SELF ADMINISTERED DRUGS (ALT 637 FOR MEDICARE OP): Performed by: PHYSICIAN ASSISTANT

## 2025-02-05 PROCEDURE — 2500000005 HC RX 250 GENERAL PHARMACY W/O HCPCS: Performed by: PHYSICIAN ASSISTANT

## 2025-02-05 PROCEDURE — 99222 1ST HOSP IP/OBS MODERATE 55: CPT | Performed by: NURSE PRACTITIONER

## 2025-02-05 PROCEDURE — 84484 ASSAY OF TROPONIN QUANT: CPT | Performed by: PHYSICIAN ASSISTANT

## 2025-02-05 PROCEDURE — 80164 ASSAY DIPROPYLACETIC ACD TOT: CPT | Performed by: PHYSICIAN ASSISTANT

## 2025-02-05 PROCEDURE — 82947 ASSAY GLUCOSE BLOOD QUANT: CPT

## 2025-02-05 PROCEDURE — 85025 COMPLETE CBC W/AUTO DIFF WBC: CPT | Performed by: PHYSICIAN ASSISTANT

## 2025-02-05 PROCEDURE — 1200000002 HC GENERAL ROOM WITH TELEMETRY DAILY

## 2025-02-05 PROCEDURE — 51798 US URINE CAPACITY MEASURE: CPT

## 2025-02-05 PROCEDURE — 74176 CT ABD & PELVIS W/O CONTRAST: CPT | Performed by: RADIOLOGY

## 2025-02-05 PROCEDURE — 83935 ASSAY OF URINE OSMOLALITY: CPT | Mod: PARLAB | Performed by: PHYSICIAN ASSISTANT

## 2025-02-05 PROCEDURE — 2500000004 HC RX 250 GENERAL PHARMACY W/ HCPCS (ALT 636 FOR OP/ED): Performed by: PHYSICIAN ASSISTANT

## 2025-02-05 PROCEDURE — 71045 X-RAY EXAM CHEST 1 VIEW: CPT | Performed by: RADIOLOGY

## 2025-02-05 PROCEDURE — 83880 ASSAY OF NATRIURETIC PEPTIDE: CPT | Performed by: PHYSICIAN ASSISTANT

## 2025-02-05 PROCEDURE — 74176 CT ABD & PELVIS W/O CONTRAST: CPT

## 2025-02-05 RX ORDER — PHENOBARBITAL 32.4 MG/1
64.8 TABLET ORAL NIGHTLY
Status: DISCONTINUED | OUTPATIENT
Start: 2025-02-05 | End: 2025-02-08 | Stop reason: HOSPADM

## 2025-02-05 RX ORDER — POLYETHYLENE GLYCOL 3350 17 G/17G
17 POWDER, FOR SOLUTION ORAL DAILY PRN
Status: DISCONTINUED | OUTPATIENT
Start: 2025-02-05 | End: 2025-02-08 | Stop reason: HOSPADM

## 2025-02-05 RX ORDER — ACETAMINOPHEN 500 MG
2 TABLET ORAL EVERY 8 HOURS
COMMUNITY
Start: 2025-01-10

## 2025-02-05 RX ORDER — ACETAMINOPHEN 325 MG/1
650 TABLET ORAL EVERY 4 HOURS PRN
Status: DISCONTINUED | OUTPATIENT
Start: 2025-02-05 | End: 2025-02-08 | Stop reason: HOSPADM

## 2025-02-05 RX ORDER — ACETAMINOPHEN 650 MG/1
650 SUPPOSITORY RECTAL EVERY 4 HOURS PRN
Status: DISCONTINUED | OUTPATIENT
Start: 2025-02-05 | End: 2025-02-08 | Stop reason: HOSPADM

## 2025-02-05 RX ORDER — TAMSULOSIN HYDROCHLORIDE 0.4 MG/1
0.4 CAPSULE ORAL EVERY MORNING
Status: DISCONTINUED | OUTPATIENT
Start: 2025-02-06 | End: 2025-02-08 | Stop reason: HOSPADM

## 2025-02-05 RX ORDER — MINERAL OIL
180 ENEMA (ML) RECTAL EVERY MORNING
COMMUNITY

## 2025-02-05 RX ORDER — CETIRIZINE HYDROCHLORIDE 10 MG/1
10 TABLET ORAL DAILY
Status: DISCONTINUED | OUTPATIENT
Start: 2025-02-06 | End: 2025-02-08 | Stop reason: HOSPADM

## 2025-02-05 RX ORDER — ONDANSETRON 4 MG/1
4 TABLET, FILM COATED ORAL EVERY 6 HOURS PRN
Status: DISCONTINUED | OUTPATIENT
Start: 2025-02-05 | End: 2025-02-08 | Stop reason: HOSPADM

## 2025-02-05 RX ORDER — LEVOTHYROXINE SODIUM 50 UG/1
50 TABLET ORAL
Status: DISCONTINUED | OUTPATIENT
Start: 2025-02-06 | End: 2025-02-08 | Stop reason: HOSPADM

## 2025-02-05 RX ORDER — HEPARIN SODIUM 5000 [USP'U]/ML
5000 INJECTION, SOLUTION INTRAVENOUS; SUBCUTANEOUS EVERY 8 HOURS
Status: DISCONTINUED | OUTPATIENT
Start: 2025-02-05 | End: 2025-02-08 | Stop reason: HOSPADM

## 2025-02-05 RX ORDER — AMLODIPINE BESYLATE 5 MG/1
5 TABLET ORAL 2 TIMES DAILY
Status: DISCONTINUED | OUTPATIENT
Start: 2025-02-05 | End: 2025-02-06

## 2025-02-05 RX ORDER — ONDANSETRON 4 MG/1
4 TABLET, FILM COATED ORAL EVERY 6 HOURS PRN
COMMUNITY
Start: 2025-02-04 | End: 2025-02-08 | Stop reason: HOSPADM

## 2025-02-05 RX ORDER — PHENOBARBITAL 32.4 MG/1
64.8 TABLET ORAL NIGHTLY
COMMUNITY
Start: 2025-01-11

## 2025-02-05 RX ORDER — METOPROLOL TARTRATE 50 MG/1
50 TABLET ORAL EVERY MORNING
Status: DISCONTINUED | OUTPATIENT
Start: 2025-02-06 | End: 2025-02-06

## 2025-02-05 RX ORDER — DEXTROSE 50 % IN WATER (D50W) INTRAVENOUS SYRINGE
25
Status: DISCONTINUED | OUTPATIENT
Start: 2025-02-05 | End: 2025-02-08 | Stop reason: HOSPADM

## 2025-02-05 RX ORDER — METOPROLOL TARTRATE 50 MG/1
50 TABLET ORAL EVERY MORNING
COMMUNITY
Start: 2025-01-11 | End: 2025-02-08 | Stop reason: HOSPADM

## 2025-02-05 RX ORDER — INSULIN LISPRO 100 [IU]/ML
0-10 INJECTION, SOLUTION INTRAVENOUS; SUBCUTANEOUS
Status: DISCONTINUED | OUTPATIENT
Start: 2025-02-05 | End: 2025-02-08 | Stop reason: HOSPADM

## 2025-02-05 RX ORDER — LOSARTAN POTASSIUM 50 MG/1
100 TABLET ORAL EVERY MORNING
Status: DISCONTINUED | OUTPATIENT
Start: 2025-02-06 | End: 2025-02-08 | Stop reason: HOSPADM

## 2025-02-05 RX ORDER — SODIUM CHLORIDE 9 MG/ML
125 INJECTION, SOLUTION INTRAVENOUS CONTINUOUS
Status: ACTIVE | OUTPATIENT
Start: 2025-02-05 | End: 2025-02-07

## 2025-02-05 RX ORDER — PHENOBARBITAL 32.4 MG/1
32.4 TABLET ORAL EVERY MORNING
Status: DISCONTINUED | OUTPATIENT
Start: 2025-02-06 | End: 2025-02-08 | Stop reason: HOSPADM

## 2025-02-05 RX ORDER — DIVALPROEX SODIUM 250 MG/1
250 TABLET, DELAYED RELEASE ORAL 2 TIMES DAILY
Status: DISCONTINUED | OUTPATIENT
Start: 2025-02-05 | End: 2025-02-08 | Stop reason: HOSPADM

## 2025-02-05 RX ORDER — DEXTROSE 50 % IN WATER (D50W) INTRAVENOUS SYRINGE
12.5
Status: DISCONTINUED | OUTPATIENT
Start: 2025-02-05 | End: 2025-02-08 | Stop reason: HOSPADM

## 2025-02-05 RX ORDER — ACETAMINOPHEN 160 MG/5ML
650 SOLUTION ORAL EVERY 4 HOURS PRN
Status: DISCONTINUED | OUTPATIENT
Start: 2025-02-05 | End: 2025-02-08 | Stop reason: HOSPADM

## 2025-02-05 RX ORDER — ARIPIPRAZOLE 5 MG/1
5 TABLET ORAL
COMMUNITY
Start: 2025-01-11

## 2025-02-05 RX ORDER — CEFTRIAXONE 1 G/50ML
1 INJECTION, SOLUTION INTRAVENOUS EVERY 24 HOURS
Status: DISCONTINUED | OUTPATIENT
Start: 2025-02-05 | End: 2025-02-08 | Stop reason: HOSPADM

## 2025-02-05 RX ORDER — ARIPIPRAZOLE 5 MG/1
5 TABLET ORAL DAILY
Status: DISCONTINUED | OUTPATIENT
Start: 2025-02-06 | End: 2025-02-08 | Stop reason: HOSPADM

## 2025-02-05 RX ORDER — ATORVASTATIN CALCIUM 20 MG/1
20 TABLET, FILM COATED ORAL NIGHTLY
Status: DISCONTINUED | OUTPATIENT
Start: 2025-02-05 | End: 2025-02-08 | Stop reason: HOSPADM

## 2025-02-05 RX ORDER — NAPROXEN SODIUM 220 MG/1
81 TABLET, FILM COATED ORAL DAILY
Status: DISCONTINUED | OUTPATIENT
Start: 2025-02-05 | End: 2025-02-08 | Stop reason: HOSPADM

## 2025-02-05 RX ORDER — ATORVASTATIN CALCIUM 20 MG/1
1 TABLET, FILM COATED ORAL NIGHTLY
COMMUNITY
Start: 2024-08-16

## 2025-02-05 RX ORDER — PANTOPRAZOLE SODIUM 20 MG/1
20 TABLET, DELAYED RELEASE ORAL
COMMUNITY
Start: 2025-01-13

## 2025-02-05 RX ORDER — MECLIZINE HCL 12.5 MG 12.5 MG/1
12.5 TABLET ORAL DAILY PRN
COMMUNITY

## 2025-02-05 RX ORDER — MECLIZINE HYDROCHLORIDE 25 MG/1
12.5 TABLET ORAL DAILY PRN
Status: DISCONTINUED | OUTPATIENT
Start: 2025-02-05 | End: 2025-02-08 | Stop reason: HOSPADM

## 2025-02-05 RX ORDER — PANTOPRAZOLE SODIUM 20 MG/1
20 TABLET, DELAYED RELEASE ORAL
Status: DISCONTINUED | OUTPATIENT
Start: 2025-02-06 | End: 2025-02-08 | Stop reason: HOSPADM

## 2025-02-05 RX ORDER — ONDANSETRON HYDROCHLORIDE 2 MG/ML
4 INJECTION, SOLUTION INTRAVENOUS EVERY 6 HOURS PRN
Status: DISCONTINUED | OUTPATIENT
Start: 2025-02-05 | End: 2025-02-08 | Stop reason: HOSPADM

## 2025-02-05 RX ADMIN — CEFTRIAXONE SODIUM 1 G: 1 INJECTION, SOLUTION INTRAVENOUS at 19:13

## 2025-02-05 RX ADMIN — SODIUM CHLORIDE 125 ML/HR: 9 INJECTION, SOLUTION INTRAVENOUS at 13:41

## 2025-02-05 RX ADMIN — DIVALPROEX SODIUM 250 MG: 250 TABLET, DELAYED RELEASE ORAL at 20:21

## 2025-02-05 RX ADMIN — HEPARIN SODIUM 5000 UNITS: 5000 INJECTION INTRAVENOUS; SUBCUTANEOUS at 17:23

## 2025-02-05 RX ADMIN — SODIUM CHLORIDE 500 ML: 9 INJECTION, SOLUTION INTRAVENOUS at 11:36

## 2025-02-05 RX ADMIN — PHENOBARBITAL 64.8 MG: 32.4 TABLET ORAL at 20:21

## 2025-02-05 RX ADMIN — AMLODIPINE BESYLATE 5 MG: 5 TABLET ORAL at 20:44

## 2025-02-05 RX ADMIN — POLYVINYL ALCOHOL, POVIDONE 1 DROP: 14; 6 SOLUTION/ DROPS OPHTHALMIC at 20:21

## 2025-02-05 SDOH — SOCIAL STABILITY: SOCIAL INSECURITY: DO YOU FEEL ANYONE HAS EXPLOITED OR TAKEN ADVANTAGE OF YOU FINANCIALLY OR OF YOUR PERSONAL PROPERTY?: NO

## 2025-02-05 SDOH — ECONOMIC STABILITY: INCOME INSECURITY: IN THE PAST 12 MONTHS HAS THE ELECTRIC, GAS, OIL, OR WATER COMPANY THREATENED TO SHUT OFF SERVICES IN YOUR HOME?: NO

## 2025-02-05 SDOH — ECONOMIC STABILITY: FOOD INSECURITY: WITHIN THE PAST 12 MONTHS, THE FOOD YOU BOUGHT JUST DIDN'T LAST AND YOU DIDN'T HAVE MONEY TO GET MORE.: NEVER TRUE

## 2025-02-05 SDOH — SOCIAL STABILITY: SOCIAL INSECURITY: HAVE YOU HAD ANY THOUGHTS OF HARMING ANYONE ELSE?: NO

## 2025-02-05 SDOH — ECONOMIC STABILITY: HOUSING INSECURITY: IN THE PAST 12 MONTHS, HOW MANY TIMES HAVE YOU MOVED WHERE YOU WERE LIVING?: 0

## 2025-02-05 SDOH — ECONOMIC STABILITY: HOUSING INSECURITY: IN THE LAST 12 MONTHS, WAS THERE A TIME WHEN YOU WERE NOT ABLE TO PAY THE MORTGAGE OR RENT ON TIME?: NO

## 2025-02-05 SDOH — SOCIAL STABILITY: SOCIAL INSECURITY: ARE THERE ANY APPARENT SIGNS OF INJURIES/BEHAVIORS THAT COULD BE RELATED TO ABUSE/NEGLECT?: NO

## 2025-02-05 SDOH — SOCIAL STABILITY: SOCIAL INSECURITY: HAS ANYONE EVER THREATENED TO HURT YOUR FAMILY OR YOUR PETS?: NO

## 2025-02-05 SDOH — ECONOMIC STABILITY: FOOD INSECURITY: WITHIN THE PAST 12 MONTHS, YOU WORRIED THAT YOUR FOOD WOULD RUN OUT BEFORE YOU GOT THE MONEY TO BUY MORE.: NEVER TRUE

## 2025-02-05 SDOH — SOCIAL STABILITY: SOCIAL INSECURITY: DO YOU FEEL UNSAFE GOING BACK TO THE PLACE WHERE YOU ARE LIVING?: NO

## 2025-02-05 SDOH — ECONOMIC STABILITY: HOUSING INSECURITY: AT ANY TIME IN THE PAST 12 MONTHS, WERE YOU HOMELESS OR LIVING IN A SHELTER (INCLUDING NOW)?: NO

## 2025-02-05 SDOH — SOCIAL STABILITY: SOCIAL INSECURITY: WITHIN THE LAST YEAR, HAVE YOU BEEN AFRAID OF YOUR PARTNER OR EX-PARTNER?: NO

## 2025-02-05 SDOH — ECONOMIC STABILITY: TRANSPORTATION INSECURITY: IN THE PAST 12 MONTHS, HAS LACK OF TRANSPORTATION KEPT YOU FROM MEDICAL APPOINTMENTS OR FROM GETTING MEDICATIONS?: NO

## 2025-02-05 SDOH — SOCIAL STABILITY: SOCIAL INSECURITY
WITHIN THE LAST YEAR, HAVE YOU BEEN KICKED, HIT, SLAPPED, OR OTHERWISE PHYSICALLY HURT BY YOUR PARTNER OR EX-PARTNER?: NO

## 2025-02-05 SDOH — SOCIAL STABILITY: SOCIAL INSECURITY: ABUSE: ADULT

## 2025-02-05 SDOH — SOCIAL STABILITY: SOCIAL INSECURITY: DOES ANYONE TRY TO KEEP YOU FROM HAVING/CONTACTING OTHER FRIENDS OR DOING THINGS OUTSIDE YOUR HOME?: NO

## 2025-02-05 SDOH — SOCIAL STABILITY: SOCIAL INSECURITY
WITHIN THE LAST YEAR, HAVE YOU BEEN RAPED OR FORCED TO HAVE ANY KIND OF SEXUAL ACTIVITY BY YOUR PARTNER OR EX-PARTNER?: NO

## 2025-02-05 SDOH — SOCIAL STABILITY: SOCIAL INSECURITY: ARE YOU OR HAVE YOU BEEN THREATENED OR ABUSED PHYSICALLY, EMOTIONALLY, OR SEXUALLY BY ANYONE?: NO

## 2025-02-05 SDOH — SOCIAL STABILITY: SOCIAL INSECURITY: WITHIN THE LAST YEAR, HAVE YOU BEEN HUMILIATED OR EMOTIONALLY ABUSED IN OTHER WAYS BY YOUR PARTNER OR EX-PARTNER?: NO

## 2025-02-05 SDOH — ECONOMIC STABILITY: FOOD INSECURITY: HOW HARD IS IT FOR YOU TO PAY FOR THE VERY BASICS LIKE FOOD, HOUSING, MEDICAL CARE, AND HEATING?: NOT VERY HARD

## 2025-02-05 SDOH — SOCIAL STABILITY: SOCIAL INSECURITY: WERE YOU ABLE TO COMPLETE ALL THE BEHAVIORAL HEALTH SCREENINGS?: YES

## 2025-02-05 SDOH — SOCIAL STABILITY: SOCIAL INSECURITY: HAVE YOU HAD THOUGHTS OF HARMING ANYONE ELSE?: NO

## 2025-02-05 ASSESSMENT — LIFESTYLE VARIABLES
HAVE YOU EVER FELT YOU SHOULD CUT DOWN ON YOUR DRINKING: NO
HOW OFTEN DO YOU HAVE 6 OR MORE DRINKS ON ONE OCCASION: NEVER
HOW MANY STANDARD DRINKS CONTAINING ALCOHOL DO YOU HAVE ON A TYPICAL DAY: PATIENT DOES NOT DRINK
SKIP TO QUESTIONS 9-10: 1
AUDIT-C TOTAL SCORE: 0
HAVE PEOPLE ANNOYED YOU BY CRITICIZING YOUR DRINKING: NO
HOW OFTEN DO YOU HAVE A DRINK CONTAINING ALCOHOL: NEVER
EVER HAD A DRINK FIRST THING IN THE MORNING TO STEADY YOUR NERVES TO GET RID OF A HANGOVER: NO
TOTAL SCORE: 0
EVER FELT BAD OR GUILTY ABOUT YOUR DRINKING: NO
AUDIT-C TOTAL SCORE: 0

## 2025-02-05 ASSESSMENT — ENCOUNTER SYMPTOMS
HEMATURIA: 0
FLANK PAIN: 0
NAUSEA: 0
DIFFICULTY URINATING: 1
FREQUENCY: 1
CHILLS: 0
VOMITING: 0
ABDOMINAL PAIN: 0
DYSURIA: 0
FEVER: 0
CONSTIPATION: 1

## 2025-02-05 ASSESSMENT — ACTIVITIES OF DAILY LIVING (ADL)
LACK_OF_TRANSPORTATION: NO
GROOMING: NEEDS ASSISTANCE
ASSISTIVE_DEVICE: WALKER
PATIENT'S MEMORY ADEQUATE TO SAFELY COMPLETE DAILY ACTIVITIES?: YES
HEARING - LEFT EAR: FUNCTIONAL
JUDGMENT_ADEQUATE_SAFELY_COMPLETE_DAILY_ACTIVITIES: YES
FEEDING YOURSELF: NEEDS ASSISTANCE
ADEQUATE_TO_COMPLETE_ADL: YES
DRESSING YOURSELF: NEEDS ASSISTANCE
TOILETING: NEEDS ASSISTANCE
WALKS IN HOME: NEEDS ASSISTANCE
BATHING: NEEDS ASSISTANCE
HEARING - RIGHT EAR: FUNCTIONAL
LACK_OF_TRANSPORTATION: NO

## 2025-02-05 ASSESSMENT — COGNITIVE AND FUNCTIONAL STATUS - GENERAL
PATIENT BASELINE BEDBOUND: NO
MOBILITY SCORE: 12
DRESSING REGULAR UPPER BODY CLOTHING: A LOT
PERSONAL GROOMING: A LOT
STANDING UP FROM CHAIR USING ARMS: A LOT
HELP NEEDED FOR BATHING: A LOT
WALKING IN HOSPITAL ROOM: A LOT
DRESSING REGULAR LOWER BODY CLOTHING: A LOT
TOILETING: A LOT
MOVING FROM LYING ON BACK TO SITTING ON SIDE OF FLAT BED WITH BEDRAILS: A LOT
MOVING TO AND FROM BED TO CHAIR: A LOT
DAILY ACTIVITIY SCORE: 12
CLIMB 3 TO 5 STEPS WITH RAILING: A LOT
EATING MEALS: A LOT
TURNING FROM BACK TO SIDE WHILE IN FLAT BAD: A LOT

## 2025-02-05 ASSESSMENT — PATIENT HEALTH QUESTIONNAIRE - PHQ9
SUM OF ALL RESPONSES TO PHQ9 QUESTIONS 1 & 2: 0
1. LITTLE INTEREST OR PLEASURE IN DOING THINGS: NOT AT ALL
2. FEELING DOWN, DEPRESSED OR HOPELESS: NOT AT ALL

## 2025-02-05 ASSESSMENT — PAIN SCALES - GENERAL: PAINLEVEL_OUTOF10: 0 - NO PAIN

## 2025-02-05 NOTE — ED TRIAGE NOTES
Pt BIBA for generalized weakness and abnormal labs. Pt is slow to respond to questions. Poor historian. Pt came from a nursing facility.

## 2025-02-05 NOTE — CONSULTS
Reason For Consult  Left femoral neck nonunion    History Of Present Illness  Vineet Lira is a 69 y.o. male presenting with abnormal labs and confusion.  The patient had presented from a skilled nursing facility.  It is documented he has a recent history and admission to a different hospital for metabolic encephalopathy.  He was admitted to [facility] with labs showing acute renal failure.  The patient had a CT of his abdomen performed that showed a left femoral neck nonunion.  Similar reports were seen from a CT at [facility] on January 12, 2025.  The patient is a very poor historian.  Per review of the EMR the patient has a history of falls.  He is status post right hip surgery for fracture in the past.  The patient was not able to answer questions appropriately during the exam today.     Past Medical History  He has a past medical history of Dry eye syndrome of bilateral lacrimal glands, Other conditions influencing health status, Personal history of other diseases of the circulatory system, Personal history of other diseases of the musculoskeletal system and connective tissue, Personal history of other endocrine, nutritional and metabolic disease, Personal history of other specified conditions, Unspecified fracture of right femur, initial encounter for closed fracture (Multi), and Ventricular premature depolarization.    Surgical History  He has a past surgical history that includes Other surgical history (04/11/2018); Other surgical history (12/01/2021); Other surgical history (12/01/2021); Other surgical history (12/01/2021); and Other surgical history (12/01/2021).     Social History  He reports that he has never smoked. He has never been exposed to tobacco smoke. He has never used smokeless tobacco. He reports that he does not drink alcohol and does not use drugs.    Family History  No family history on file.     Allergies  Hydantoins, Pseudoephedrine, Diluent for epoprostenol (gly), Neomycin, Phenytoin,  "Metoclopramide, Cefazolin, and Codeine         Physical Exam  The patient is lying in bed and is alert and oriented x 1.  He was not able to explain when he may have had any recent falls.  When attempting to perform a logroll of his left leg he complained of pain at the knee as opposed to the groin.  He also had no complaints of groin pain with a heel tap.     Last Recorded Vitals  Blood pressure 118/59, pulse (!) 124, resp. rate 17, height 1.778 m (5' 10\"), weight 113 kg (249 lb), SpO2 95%.    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    Scheduled medications  amLODIPine, 5 mg, oral, BID  [START ON 2/6/2025] ARIPiprazole, 5 mg, oral, Daily  aspirin, 81 mg, oral, Daily  [Held by provider] atorvastatin, 20 mg, oral, Nightly  cefTRIAXone, 1 g, intravenous, q24h  [START ON 2/6/2025] cetirizine, 10 mg, oral, Daily  heparin (porcine), 5,000 Units, subcutaneous, q8h  insulin lispro, 0-10 Units, subcutaneous, TID AC  [START ON 2/6/2025] levothyroxine, 50 mcg, oral, Daily  [Held by provider] losartan, 100 mg, oral, q AM  lubricating eye drops, 1 drop, Both Eyes, BID  [START ON 2/6/2025] metoprolol tartrate, 50 mg, oral, q AM  [Held by provider] pantoprazole, 20 mg, oral, Daily before breakfast  [START ON 2/6/2025] tamsulosin, 0.4 mg, oral, q AM      Continuous medications  sodium chloride 0.9%, 125 mL/hr, Last Rate: 125 mL/hr (02/05/25 1341)      PRN medications  PRN medications: acetaminophen **OR** acetaminophen **OR** acetaminophen, dextrose, dextrose, glucagon, glucagon, meclizine, ondansetron **OR** ondansetron, polyethylene glycol  Results for orders placed or performed during the hospital encounter of 02/05/25 (from the past 24 hours)   CBC and Auto Differential   Result Value Ref Range    WBC 11.3 4.4 - 11.3 x10*3/uL    nRBC 0.0 0.0 - 0.0 /100 WBCs    RBC 4.17 (L) 4.50 - 5.90 x10*6/uL    Hemoglobin 13.6 13.5 - 17.5 g/dL    " Hematocrit 39.4 (L) 41.0 - 52.0 %    MCV 95 80 - 100 fL    MCH 32.6 26.0 - 34.0 pg    MCHC 34.5 32.0 - 36.0 g/dL    RDW 12.9 11.5 - 14.5 %    Platelets 144 (L) 150 - 450 x10*3/uL    Neutrophils % 77.2 40.0 - 80.0 %    Immature Granulocytes %, Automated 0.8 0.0 - 0.9 %    Lymphocytes % 12.2 13.0 - 44.0 %    Monocytes % 9.0 2.0 - 10.0 %    Eosinophils % 0.5 0.0 - 6.0 %    Basophils % 0.3 0.0 - 2.0 %    Neutrophils Absolute 8.75 (H) 1.20 - 7.70 x10*3/uL    Immature Granulocytes Absolute, Automated 0.09 0.00 - 0.70 x10*3/uL    Lymphocytes Absolute 1.38 1.20 - 4.80 x10*3/uL    Monocytes Absolute 1.02 (H) 0.10 - 1.00 x10*3/uL    Eosinophils Absolute 0.06 0.00 - 0.70 x10*3/uL    Basophils Absolute 0.03 0.00 - 0.10 x10*3/uL   Magnesium   Result Value Ref Range    Magnesium 2.36 1.60 - 2.40 mg/dL   Comprehensive metabolic panel   Result Value Ref Range    Glucose 160 (H) 74 - 99 mg/dL    Sodium 132 (L) 136 - 145 mmol/L    Potassium 4.3 3.5 - 5.3 mmol/L    Chloride 90 (L) 98 - 107 mmol/L    Bicarbonate 24 21 - 32 mmol/L    Anion Gap 22 (H) 10 - 20 mmol/L    Urea Nitrogen 94 (HH) 6 - 23 mg/dL    Creatinine 6.36 (H) 0.50 - 1.30 mg/dL    eGFR 9 (L) >60 mL/min/1.73m*2    Calcium 9.3 8.6 - 10.3 mg/dL    Albumin 4.0 3.4 - 5.0 g/dL    Alkaline Phosphatase 87 33 - 136 U/L    Total Protein 7.8 6.4 - 8.2 g/dL    AST 14 9 - 39 U/L    Bilirubin, Total 0.4 0.0 - 1.2 mg/dL    ALT 14 10 - 52 U/L   B-Type Natriuretic Peptide   Result Value Ref Range    BNP 23 0 - 99 pg/mL   Lactate   Result Value Ref Range    Lactate 1.3 0.4 - 2.0 mmol/L   Troponin I, High Sensitivity, Initial   Result Value Ref Range    Troponin I, High Sensitivity 11 0 - 20 ng/L   Creatine Kinase   Result Value Ref Range    Creatine Kinase 15 0 - 325 U/L   Urinalysis with Reflex Culture and Microscopic   Result Value Ref Range    Color, Urine Yellow Light-Yellow, Yellow, Dark-Yellow    Appearance, Urine Turbid (N) Clear    Specific Gravity, Urine 1.025 1.005 - 1.035    pH,  Urine 5.0 5.0, 5.5, 6.0, 6.5, 7.0, 7.5, 8.0    Protein, Urine 30 (1+) (A) NEGATIVE, 10 (TRACE), 20 (TRACE) mg/dL    Glucose, Urine 30 (TRACE) (A) Normal mg/dL    Blood, Urine 0.06 (1+) (A) NEGATIVE mg/dL    Ketones, Urine NEGATIVE NEGATIVE mg/dL    Bilirubin, Urine NEGATIVE NEGATIVE mg/dL    Urobilinogen, Urine Normal Normal mg/dL    Nitrite, Urine NEGATIVE NEGATIVE    Leukocyte Esterase, Urine NEGATIVE NEGATIVE   Urinalysis Microscopic   Result Value Ref Range    WBC, Urine 11-20 (A) 1-5, NONE /HPF    WBC Clumps, Urine RARE Reference range not established. /HPF    RBC, Urine 1-2 NONE, 1-2, 3-5 /HPF    Bacteria, Urine 1+ (A) NONE SEEN /HPF    Mucus, Urine FEW Reference range not established. /LPF    Hyaline Casts, Urine 2+ (A) NONE /LPF    Fine Granular Casts, Urine 2+ (A) NONE /LPF   Sodium, Urine Random   Result Value Ref Range    Sodium, Urine Random 12 mmol/L    Creatinine, Urine Random 298.0 20.0 - 370.0 mg/dL    Sodium/Creatinine Ratio 4 Not established. mmol/g Creat   Troponin, High Sensitivity, 1 Hour   Result Value Ref Range    Troponin I, High Sensitivity 12 0 - 20 ng/L   Valproic acid level, total   Result Value Ref Range    Valproic Acid 24 (L) 50 - 100 ug/mL        Assessment/Plan     The patient has a left femoral neck chronic nonunion.  There is reported history of multiple falls in the EMR.  At this time given the patient's confusion and other medical comorbidities an urgent surgery is not necessary.  Dedicated left hip x-rays will be ordered.  However when the patient is more lucid and his other medical issues resolved another discussion can be had with the patient regarding potential surgical management.    Daniel Snyder MD

## 2025-02-05 NOTE — PROGRESS NOTES
Occupational Therapy                 Therapy Communication Note    Patient Name: Vineet Lira  MRN: [id number]  Department: [facility] ED  Room: [place] [address]  Today's Date: 2/5/2025     Discipline: Occupational Therapy    OT Missed Visit: Yes     Missed Visit Reason:  Chart reviewed and case conference with RN at 14:32 when attempted initial OT eval.  Initiated interview process bedside however unable to proceed further as noted resting HR at 124.  Discussed with RN; will hold at this time until patient medically stable.    Missed Time: Attempt

## 2025-02-05 NOTE — ED PROVIDER NOTES
HPI   No chief complaint on file.      HPI This is a 69-year-old male with a history of seizures frequent falls hypertension DM who presents from the nursing home for worsening kidney functions.  Patient is a limited information but states he thinks he is urinating okay does not really have any pain that he is aware of.  No cough or cold symptoms.  He says he does feel slightly nauseated no chest pain or shortness of breath no bowel changes.  No numbness tingling or weakness.  The doctor at the nursing home is Dr. Kyle Jackson.  Doing labs from the nursing home patient's has BUN of 84 with a creatinine of 6.3 with a GFR of 9        Patient History   Past Medical History:   Diagnosis Date    Dry eye syndrome of bilateral lacrimal glands     Dry eyes    Other conditions influencing health status     Abdominal Paracentesis (Diagnostic)    Personal history of other diseases of the circulatory system     History of hypertension    Personal history of other diseases of the musculoskeletal system and connective tissue     History of osteoporosis    Personal history of other endocrine, nutritional and metabolic disease     History of high cholesterol    Personal history of other specified conditions     History of seizure    Unspecified fracture of right femur, initial encounter for closed fracture (Multi)     Femur fracture, right    Ventricular premature depolarization     Premature ventricular contraction     Past Surgical History:   Procedure Laterality Date    OTHER SURGICAL HISTORY  04/11/2018    Reported Hx Of Hip Replacement    OTHER SURGICAL HISTORY  12/01/2021    Complete colonoscopy    OTHER SURGICAL HISTORY  12/01/2021    Femur fracture repair    OTHER SURGICAL HISTORY  12/01/2021    Surgery    OTHER SURGICAL HISTORY  12/01/2021    Tonsillectomy     No family history on file.  Social History     Tobacco Use    Smoking status: Never     Passive exposure: Never    Smokeless tobacco: Never   Substance Use  Topics    Alcohol use: Never    Drug use: Never       Physical Exam   ED Triage Vitals   Temp Pulse Resp BP   -- -- -- --      SpO2 Temp src Heart Rate Source Patient Position   -- -- -- --      BP Location FiO2 (%)     -- --       Physical Exam      Reviewed family history social history and allergies and were noncontributory to current problem.    Review of systems as noted in history of present illness  otherwise negative. All other systems were reviewed and negative.     PMHX: Chronic conditions: reviewd in EMR, relevant history noted in HPI                Surgeries, hospitalizations: reviewed in EMR , relevant history noted in HPI                Medications: reviewed in EMR, relevant history noted in HPI                Allergies: reviewed in EMR, relevant history noted in HPI      PHYSICAL EXAM:    GENERAL/ CONSTITUTIONAL: Vitals noted, no distress. Alert and oriented  x 3. Non-toxic.  No Drooling or stridor .    HEAD: Normocephalic Atraumatic    EENT:  Posterior oropharynx unremarkable. No meningismus. No LAD.  No exudate present.      EYES: PERRLA EOMI     NECK: Supple. Nontender. No midline tenderness.  Full range of motion    CARDIAC: Regular, rate, rhythm. No murmurs rubs or gallops. No JVD    PULMONARY: Lungs clear bilaterally with good aeration. No wheezes rales or rhonchi. No respiratory distress.     GI: Soft, . Nontender. No peritoneal signs. Normoactive bowel sounds. No pulsatile masses.  No guarding or rebound    EXTREMITIES/MUSCULOSKELTAL: +2/4peripheral edema.  NVIT, dorsal pedis pulses +2 /4 equal. FROM in all extremities and equal.     SKIN: No rash. Intact.     NEURO: No focal neurologic deficits,     PSYCH: appropriate mood and affect    MEDICAL DECISION MAKING:    ED Course & MDM   ED Course as of 02/05/25 1149   Wed Feb 05, 2025   1042 Interpreted by the Emergency Department Attending: ECG revealed sinus tachycardia at a rate of 125 beats per minute with OK interval 123 , QRS of 105 , QTc of  420.  No acute injury pattern.  No previous EKG to compare. [MG]   1122 SODIUM(!): 132 [CV]   1122 CHLORIDE(!): 90  Receiving fluids [CV]   1123 Anion Gap(!): 22 [CV]   1123 Blood Urea Nitrogen(!!): 94 [CV]   1123 Creatinine(!): 6.36 [CV]   1123 EGFR(!): 9  Consistent with NH labs [CV]   1123 No evidence of acute cardiopulmonary process.       [CV]      ED Course User Index  [CV] Radha Akbar PA-C  [MG] Darian García DO         Diagnoses as of 02/05/25 1149   CELESTE (acute kidney injury) (CMS-[facility])   Urinary retention     Labs ordered I will seek admission fact of the kidney functions are as noted above from nursing home.    EKG shows sinus tachycardia rate of 125 probable LAE left anterior fascicular block LVH no STEMI interpreted by Dr. Mayfield at 1021    Patient has an CELESTE.  Patient does have urinary retention with 470 in the bladder he will be cath.  Culture will be sent as well patient will be admitted to Dr. Marshal Jackson's service.  Fluids have  been given            No data recorded                                 Medical Decision Making    Dr. Marshal Jackson's service admission  Procedure  Procedures     Radha Akbar PA-C  02/05/25 1149

## 2025-02-05 NOTE — PROGRESS NOTES
Physical Therapy                 Therapy Communication Note    Patient Name: Vineet Lira  MRN: [id number]  Department: [facility] ED  [facility]  Today's Date: 2/5/2025     Discipline: Physical Therapy        Intake interview performed- patient lethargic, slow processing, tangential, questionable historian.  Functional mobility assessment deferred as patient w/  at rest. Discussed with RN; will hold at this time until patient medically stable.

## 2025-02-05 NOTE — CONSULTS
Inpatient consult to Urology  Consult performed by: Meagan Holley, APRN-CNP  Consult ordered by: Liang Medina PA-C  Reason for consult: Urinary retention/CELESTE          Reason For Consult  Urinary retention/CELESTE     History Of Present Illness  Vineet Lira is a 69 y.o. male with PMH seizures, GERD, Vitamin B12 def, depression, hypothyroidism, HLD, HTN and DM. Presented to the ED with CELESTE from recent lab work done at [facility]. Gonzales catheter placed in ER after bladder scan showed 450cc. Per nursing, no difficulty with gonzales placement. Output is clear/yellow. He c/o discomfort around urethral meatus since catheter placed.     Limited history from patient. He denies any abdominal or flank pain, no fever, chills, nausea or vomiting. He has noticed in the last few months worsening constipation with pushing/straining to have a bowel movement. He reports over the last few years he's had difficulty starting urine stream, frequency, incomplete bladder emptying, dribbling, incontinence and nocturia a few times per night. He denies dysuria, hematuria or urgency. Per chart review, he was evaluated by PCP in early 2024 with reports of urinary incontinence and was prescribed Flomax 0.4mg.     He was evaluated in [facility] ER on 1/12 for fall with CT A/P showing no calculi or hydroureteronephrosis. Multiple Bosniak I and/or Bosniak II renal cysts which do not meet imaging criteria for follow up. Small amount of gas in the urinary bladder. BUN/Creatinine was 19/0.63.     CT and Renal US pending    BUN/Creat: 94/6.36    Leukocytosis 12.0 but has since normalized    UA: 1+ protein, trace glucose, 1+ blood, 11-20 WBC, 1+ bacteria, 2+ hyaline casts, 2+ granular casts. Cultures pending      Past Medical History  He has a past medical history of Dry eye syndrome of bilateral lacrimal glands, Other conditions influencing health status, Personal history of other diseases of the circulatory system, Personal history of other diseases of  "the musculoskeletal system and connective tissue, Personal history of other endocrine, nutritional and metabolic disease, Personal history of other specified conditions, Unspecified fracture of right femur, initial encounter for closed fracture (Multi), and Ventricular premature depolarization.    Surgical History  He has a past surgical history that includes Other surgical history (04/11/2018); Other surgical history (12/01/2021); Other surgical history (12/01/2021); Other surgical history (12/01/2021); and Other surgical history (12/01/2021).     Social History  He reports that he has never smoked. He has never been exposed to tobacco smoke. He has never used smokeless tobacco. He reports that he does not drink alcohol and does not use drugs.    Family History  No family history on file.     Allergies  Hydantoins, Pseudoephedrine, Diluent for epoprostenol (gly), Neomycin, Phenytoin, Metoclopramide, Cefazolin, and Codeine    Review of Systems   Constitutional:  Negative for chills and fever.   Gastrointestinal:  Positive for constipation. Negative for abdominal pain, nausea and vomiting.   Genitourinary:  Positive for difficulty urinating and frequency. Negative for dysuria, flank pain, hematuria and urgency.        Physical Exam  Pulmonary:      Effort: Pulmonary effort is normal.   Abdominal:      General: Abdomen is flat.      Tenderness: There is no abdominal tenderness. There is no right CVA tenderness or left CVA tenderness.   Genitourinary:     Penis: Circumcised.       Comments: Caldwell intact with clear/yellow output  Neurological:      Mental Status: He is alert.          Last Recorded Vitals  Blood pressure 117/62, pulse (!) 122, resp. rate 16, height 1.778 m (5' 10\"), weight 113 kg (249 lb), SpO2 97%.    Relevant Results  Scheduled medications  cefTRIAXone, 1 g, intravenous, q24h  heparin (porcine), 5,000 Units, subcutaneous, q8h  insulin lispro, 0-10 Units, subcutaneous, TID AC      Continuous " medications  sodium chloride 0.9%, 125 mL/hr, Last Rate: 125 mL/hr (02/05/25 1341)      PRN medications  PRN medications: acetaminophen **OR** acetaminophen **OR** acetaminophen, dextrose, dextrose, glucagon, glucagon, ondansetron **OR** ondansetron, polyethylene glycol   Results for orders placed or performed during the hospital encounter of 02/05/25 (from the past 24 hours)   CBC and Auto Differential   Result Value Ref Range    WBC 11.3 4.4 - 11.3 x10*3/uL    nRBC 0.0 0.0 - 0.0 /100 WBCs    RBC 4.17 (L) 4.50 - 5.90 x10*6/uL    Hemoglobin 13.6 13.5 - 17.5 g/dL    Hematocrit 39.4 (L) 41.0 - 52.0 %    MCV 95 80 - 100 fL    MCH 32.6 26.0 - 34.0 pg    MCHC 34.5 32.0 - 36.0 g/dL    RDW 12.9 11.5 - 14.5 %    Platelets 144 (L) 150 - 450 x10*3/uL    Neutrophils % 77.2 40.0 - 80.0 %    Immature Granulocytes %, Automated 0.8 0.0 - 0.9 %    Lymphocytes % 12.2 13.0 - 44.0 %    Monocytes % 9.0 2.0 - 10.0 %    Eosinophils % 0.5 0.0 - 6.0 %    Basophils % 0.3 0.0 - 2.0 %    Neutrophils Absolute 8.75 (H) 1.20 - 7.70 x10*3/uL    Immature Granulocytes Absolute, Automated 0.09 0.00 - 0.70 x10*3/uL    Lymphocytes Absolute 1.38 1.20 - 4.80 x10*3/uL    Monocytes Absolute 1.02 (H) 0.10 - 1.00 x10*3/uL    Eosinophils Absolute 0.06 0.00 - 0.70 x10*3/uL    Basophils Absolute 0.03 0.00 - 0.10 x10*3/uL   Magnesium   Result Value Ref Range    Magnesium 2.36 1.60 - 2.40 mg/dL   Comprehensive metabolic panel   Result Value Ref Range    Glucose 160 (H) 74 - 99 mg/dL    Sodium 132 (L) 136 - 145 mmol/L    Potassium 4.3 3.5 - 5.3 mmol/L    Chloride 90 (L) 98 - 107 mmol/L    Bicarbonate 24 21 - 32 mmol/L    Anion Gap 22 (H) 10 - 20 mmol/L    Urea Nitrogen 94 (HH) 6 - 23 mg/dL    Creatinine 6.36 (H) 0.50 - 1.30 mg/dL    eGFR 9 (L) >60 mL/min/1.73m*2    Calcium 9.3 8.6 - 10.3 mg/dL    Albumin 4.0 3.4 - 5.0 g/dL    Alkaline Phosphatase 87 33 - 136 U/L    Total Protein 7.8 6.4 - 8.2 g/dL    AST 14 9 - 39 U/L    Bilirubin, Total 0.4 0.0 - 1.2 mg/dL     ALT 14 10 - 52 U/L   B-Type Natriuretic Peptide   Result Value Ref Range    BNP 23 0 - 99 pg/mL   Lactate   Result Value Ref Range    Lactate 1.3 0.4 - 2.0 mmol/L   Troponin I, High Sensitivity, Initial   Result Value Ref Range    Troponin I, High Sensitivity 11 0 - 20 ng/L   Creatine Kinase   Result Value Ref Range    Creatine Kinase 15 0 - 325 U/L   Urinalysis with Reflex Culture and Microscopic   Result Value Ref Range    Color, Urine Yellow Light-Yellow, Yellow, Dark-Yellow    Appearance, Urine Turbid (N) Clear    Specific Gravity, Urine 1.025 1.005 - 1.035    pH, Urine 5.0 5.0, 5.5, 6.0, 6.5, 7.0, 7.5, 8.0    Protein, Urine 30 (1+) (A) NEGATIVE, 10 (TRACE), 20 (TRACE) mg/dL    Glucose, Urine 30 (TRACE) (A) Normal mg/dL    Blood, Urine 0.06 (1+) (A) NEGATIVE mg/dL    Ketones, Urine NEGATIVE NEGATIVE mg/dL    Bilirubin, Urine NEGATIVE NEGATIVE mg/dL    Urobilinogen, Urine Normal Normal mg/dL    Nitrite, Urine NEGATIVE NEGATIVE    Leukocyte Esterase, Urine NEGATIVE NEGATIVE   Urinalysis Microscopic   Result Value Ref Range    WBC, Urine 11-20 (A) 1-5, NONE /HPF    WBC Clumps, Urine RARE Reference range not established. /HPF    RBC, Urine 1-2 NONE, 1-2, 3-5 /HPF    Bacteria, Urine 1+ (A) NONE SEEN /HPF    Mucus, Urine FEW Reference range not established. /LPF    Hyaline Casts, Urine 2+ (A) NONE /LPF    Fine Granular Casts, Urine 2+ (A) NONE /LPF   Sodium, Urine Random   Result Value Ref Range    Sodium, Urine Random 12 mmol/L    Creatinine, Urine Random 298.0 20.0 - 370.0 mg/dL    Sodium/Creatinine Ratio 4 Not established. mmol/g Creat   Troponin, High Sensitivity, 1 Hour   Result Value Ref Range    Troponin I, High Sensitivity 12 0 - 20 ng/L    XR chest 1 view    Result Date: 2/5/2025  Interpreted By:  Schoenberger, Joseph, STUDY: XR CHEST 1 VIEW;  2/5/2025 10:28 am   INDICATION: Signs/Symptoms:weak.     COMPARISON: None.   ACCESSION NUMBER(S): [id number]   ORDERING CLINICIAN: KRISTEN BAEZ   FINDINGS:          CARDIOMEDIASTINAL SILHOUETTE: Cardiomediastinal silhouette is normal in size and configuration.   LUNGS: Lungs are clear.   ABDOMEN: No remarkable upper abdominal findings.   BONES: No acute osseous changes.       1.  No evidence of acute cardiopulmonary process.       MACRO: None   Signed by: Joseph Schoenberger 2/5/2025 10:30 AM Dictation workstation:   [id number]    Assessment/Plan     68 yo male with acute urinary retention and CELESTE: BUN 90/creatinine 6.36 today, BUN 80/creatinine 6.31 on 2/4/25 (BUN 19/creatinine 0.63 on 1/12/2025) with unclear etiology. Pt experiencing LUTS for the past few years. Urine and blood cx pending. CT A/P and Renal US pending.     - Maintain gonzales catheter for decompression   - Continue Flomax 0.4mg   - Waiting on imaging results  - Nephrology consult    Pt discussed with Dr. Borja    I spent 30 minutes in the professional and overall care of this patient.    02/05/25 at 2:34 PM - REYES Campbell-CNP

## 2025-02-05 NOTE — PROGRESS NOTES
Pharmacy Medication History Review    Vineet Lira is a 69 y.o. male admitted for CELESTE (acute kidney injury) (CMS-HCC). Pharmacy reviewed the patient's [id number] medications and allergies for accuracy.    The list below reflectives the updated PTA list. Please review each medication in order reconciliation for additional clarification and justification.  Prior to Admission medications    Medication Sig Start Date End Date Taking? Authorizing Provider   acetaminophen (Tylenol) 500 mg tablet Take 2 tablets (1,000 mg) by mouth every 8 hours. 1/10/25  Yes Historical Provider, MD   ARIPiprazole (Abilify) 5 mg tablet Take 1 tablet (5 mg) by mouth once daily. 1/11/25  Yes Historical Provider, MD   atorvastatin (Lipitor) 20 mg tablet Take 1 tablet (20 mg) by mouth once daily at bedtime. 8/16/24  Yes Historical Provider, MD   amLODIPine (Norvasc) 5 mg tablet Take 1 tablet (5 mg) by mouth 2 times a day. 11/19/23  yes Historical Provider, MD   aspirin 81 mg chewable tablet Chew 1 tablet (81 mg) once daily. 10/22/09  yes Historical Provider, MD   divalproex (Depakote) 250 mg EC tablet Take 1 tablet (250 mg) by mouth 2 times a day. Take with 500 mg tablet  Patient taking differently: Take 1 tablet (250 mg) by mouth 2 times a day. 3/18/24 3/18/25 yes Maxi Garces MD   divalproex (Depakote) 500 mg EC tablet Take 1 tablet (500 mg) by mouth 2 times a day. Take with 250 mg tablet  Patient not taking: Reported on 2/5/2025 3/18/24 3/18/25 no Maxi Garces MD   esomeprazole (NexIUM) 20 mg DR capsule Take 1 capsule (20 mg) by mouth once daily.  Patient not taking: Reported on 2/5/2025   no Historical Provider, MD   fexofenadine (Allegra) 180 mg tablet Take 1 tablet (180 mg) by mouth once daily in the morning.   yes Historical Provider, MD   FLUoxetine (PROzac) 20 mg capsule Take 1 capsule (20 mg) by mouth once daily.  Patient not taking: Reported on 2/5/2025 2/9/22  no Historical Provider, MD   levothyroxine  (Synthroid, Levoxyl) 50 mcg tablet Take 1 tablet (50 mcg) by mouth early in the morning.. 12/11/23  yes Historical Provider, MD   losartan (Cozaar) 100 mg tablet Take 1 tablet (100 mg) by mouth once daily in the morning.   yes Historical Provider, MD   lubricating eye drops ophthalmic solution Administer 1 drop into both eyes 2 times a day.   yes Historical Provider, MD   meclizine (Antivert) 12.5 mg tablet Take 1 tablet (12.5 mg) by mouth once daily as needed for dizziness.   yes Historical Provider, MD   metFORMIN (Glucophage) 500 mg tablet Take by mouth every 12 hours.  Patient not taking: Reported on 2/5/2025   no Historical Provider, MD   metoprolol succinate XL (Toprol-XL) 50 mg 24 hr tablet Take 1 tablet (50 mg) by mouth 2 times a day. 1/30/24 1/29/25 no Sohail Dickson MD   metoprolol tartrate (Lopressor) 50 mg tablet Take 1 tablet by mouth once daily in the morning. 1/11/25  yes Historical Provider, MD   naproxen (Naprosyn) 500 mg tablet Take 1 tablet (500 mg) by mouth 2 times a day with meals.  Patient not taking: Reported on 2/5/2025 2/23/23  no Historical Provider, MD   ondansetron (Zofran) 4 mg tablet Take 1 tablet (4 mg) by mouth every 6 hours if needed for nausea or vomiting. 2/4/25 2/7/25 yes Historical Provider, MD   pantoprazole (ProtoNix) 20 mg EC tablet Take 1 tablet (20 mg) by mouth once daily in the morning. Take before meals. Do not crush, chew, or split. 1/13/25  yes Historical Provider, MD   PHENobarbitaL (Luminal) 32.4 mg tablet 1 by mouth in morning, 2 in evening  Patient taking differently: Take 1 tablet (32.4 mg) by mouth once daily in the morning. 11/25/24  yes Maxi Garces MD   PHENobarbital (Luminal) 32.4 mg tablet Take 2 tablets (64.8 mg) by mouth once daily at bedtime. 1/11/25  yes Historical Provider, MD   rosuvastatin (Crestor) 10 mg tablet Take 1 tablet (10 mg) by mouth once daily.  Patient not taking: Reported on 2/5/2025 9/15/22  no Historical Provider, MD    tamsulosin (Flomax) 0.4 mg 24 hr capsule Take 1 capsule (0.4 mg) by mouth once daily in the morning.   yes Historical Provider, MD   tiZANidine (Zanaflex) 4 mg tablet Take 1 tablet (4 mg) by mouth every 8 hours if needed.  Patient not taking: Reported on 2/5/2025 3/25/23  no Historical Provider, MD        The list below reflectives the updated allergy list. Please review each documented allergy for additional clarification and justification.  Allergies  Reviewed by Dorinda Watson on 2/5/2025        Severity Reactions Comments    Hydantoins High Seizure worsening petit mal sz    Pseudoephedrine High Seizure     Diluent For Epoprostenol (gly) Medium Confusion AMS    Neomycin Medium Other (eye gtt - redness)    Phenytoin Medium Confusion     Metoclopramide Not Specified Unknown     Cefazolin Low Diarrhea     Codeine Low Nausea/vomiting             Below are additional concerns with the patient's PTA list.    Medication list from the [facility], printed 02/05/25 5536.    Dorinda Watson

## 2025-02-05 NOTE — H&P
History Of Present Illness  Vineet Lira is a 69 y.o. male with past medical history significant for obesity, seizures, frequent falls, BPH, GERD, vitamin B12 deficiency, depression, hypothyroidism, HLD, HTN, and DM who presents from nursing facility for worsening kidney function on lab work yesterday.  History somewhat limited from patient.  Does admit to occasional mild nausea, generalized abdominal pain, and decreased appetite/oral intake over the past 3-4 days.  Does report he has not been peeing as much over the past week or so.  States this has happened in the past, however is unable to tell me any more information about it. Denies any pain with urination or bleeding.  Does report he has been feeling weaker, and of note, patient seen multiple times in the past month at [facility] for evaluation after falls.  Per EMR review, imaging from 1/3 shows no acute processes.  CT head and C-spine imaging from 1/12 are unremarkable.  CT T-spine/L-spine shows prominent osteopenia, interval progression of loss of height of L2 vertebral body, this is subacute/chronic.  CT chest/abdomen/pelvis from 1/12 shows subacute to chronic appearing fracture of left femoral neck, fracture deformities of the spine, no other potentially acute traumatic injuries.  Patient currently denying any symptoms in the ED aside from mild abdominal discomfort.  States he feels like he has not had an appetite recently.  Denies headaches, dizziness, chest pains, shortness of breath, upper respiratory symptoms, vomiting, or fever/chills.  Patient's PCP is Dr. Marshal Jackson.  Denies taking any recent ibuprofen.  Denies any changes in medications.    ED course: On arrival to the ED, patient afebrile, tachycardic with heart rate 130, otherwise hemodynamic stable with SpO2 94% on room air. Glucose 160, sodium 132, chloride 90, BUN 94/creatinine 6.36.  Magnesium 2.36, lactate 1.3, BNP 23, troponin 11.  WBC 11.3.  Hemoglobin 13.6/hematocrit 39.4, platelets  144.  Chest x-ray shows no evidence of acute cardiopulmonary process. Patient had a bladder scan done in the ED which revealed 470 mL in the bladder, Caldwell catheter placed and urinalysis/culture ordered. Urinalysis shows yellow and turbid appearance with 1+ protein, trace glucose, 1+ blood, negative nitrites, negative leukocyte esterase, 11-20 WBCs, 1+ bacteria, 2+ hyaline cast, 2+ granular casts.  Patient given 500 mL NS bolus in the ED.    EKG (interpreted by ED physician): Sinus tachycardia, rate 125 bpm, NY interval 123, , QTc 420.  No acute injury pattern.    Admitting providers Dr. Jackson     Past Medical History  Past Medical History:   Diagnosis Date    Dry eye syndrome of bilateral lacrimal glands     Dry eyes    Other conditions influencing health status     Abdominal Paracentesis (Diagnostic)    Personal history of other diseases of the circulatory system     History of hypertension    Personal history of other diseases of the musculoskeletal system and connective tissue     History of osteoporosis    Personal history of other endocrine, nutritional and metabolic disease     History of high cholesterol    Personal history of other specified conditions     History of seizure    Unspecified fracture of right femur, initial encounter for closed fracture (Multi)     Femur fracture, right    Ventricular premature depolarization     Premature ventricular contraction     Surgical History  Past Surgical History:   Procedure Laterality Date    OTHER SURGICAL HISTORY  04/11/2018    Reported Hx Of Hip Replacement    OTHER SURGICAL HISTORY  12/01/2021    Complete colonoscopy    OTHER SURGICAL HISTORY  12/01/2021    Femur fracture repair    OTHER SURGICAL HISTORY  12/01/2021    Surgery    OTHER SURGICAL HISTORY  12/01/2021    Tonsillectomy     Social History  He reports that he has never smoked. He has never been exposed to tobacco smoke. He has never used smokeless tobacco. He reports that he does not drink  "alcohol and does not use drugs.    Family History  No family history on file.     Allergies  Cefazolin, Codeine, Metoclopramide, and Phenytoin    Review of Systems  10 point review of system negative except as noted above in HPI    Physical Exam  Constitutional:       General: He is not in acute distress.     Appearance: He is obese.   HENT:      Head: Normocephalic and atraumatic.      Mouth/Throat:      Mouth: Mucous membranes are dry.      Pharynx: Oropharynx is clear.   Eyes:      Extraocular Movements: Extraocular movements intact.      Conjunctiva/sclera: Conjunctivae normal.      Pupils: Pupils are equal, round, and reactive to light.   Cardiovascular:      Rate and Rhythm: Regular rhythm. Tachycardia present.      Pulses: Normal pulses.      Heart sounds: Normal heart sounds.   Pulmonary:      Effort: Pulmonary effort is normal. No respiratory distress.      Breath sounds: Normal breath sounds. No wheezing.   Abdominal:      General: Bowel sounds are normal.      Palpations: Abdomen is soft.      Tenderness: There is abdominal tenderness (Generalized abdominal tenderness with palpation).   Genitourinary:     Comments: Caldwell catheter in place, good urine output, dark in color.  Musculoskeletal:      Right lower leg: Edema present.      Left lower leg: Edema present.   Skin:     General: Skin is warm and dry.   Neurological:      Mental Status: He is alert. Mental status is at baseline.      Motor: Weakness present.   Psychiatric:         Mood and Affect: Mood normal.         Behavior: Behavior normal.       Last Recorded Vitals  Blood pressure 117/62, pulse (!) 122, resp. rate 16, height 1.778 m (5' 10\"), weight 113 kg (249 lb), SpO2 97%.    Relevant Results  Results for orders placed or performed during the hospital encounter of 02/05/25 (from the past 24 hours)   CBC and Auto Differential   Result Value Ref Range    WBC 11.3 4.4 - 11.3 x10*3/uL    nRBC 0.0 0.0 - 0.0 /100 WBCs    RBC 4.17 (L) 4.50 - 5.90 " x10*6/uL    Hemoglobin 13.6 13.5 - 17.5 g/dL    Hematocrit 39.4 (L) 41.0 - 52.0 %    MCV 95 80 - 100 fL    MCH 32.6 26.0 - 34.0 pg    MCHC 34.5 32.0 - 36.0 g/dL    RDW 12.9 11.5 - 14.5 %    Platelets 144 (L) 150 - 450 x10*3/uL    Neutrophils % 77.2 40.0 - 80.0 %    Immature Granulocytes %, Automated 0.8 0.0 - 0.9 %    Lymphocytes % 12.2 13.0 - 44.0 %    Monocytes % 9.0 2.0 - 10.0 %    Eosinophils % 0.5 0.0 - 6.0 %    Basophils % 0.3 0.0 - 2.0 %    Neutrophils Absolute 8.75 (H) 1.20 - 7.70 x10*3/uL    Immature Granulocytes Absolute, Automated 0.09 0.00 - 0.70 x10*3/uL    Lymphocytes Absolute 1.38 1.20 - 4.80 x10*3/uL    Monocytes Absolute 1.02 (H) 0.10 - 1.00 x10*3/uL    Eosinophils Absolute 0.06 0.00 - 0.70 x10*3/uL    Basophils Absolute 0.03 0.00 - 0.10 x10*3/uL   Magnesium   Result Value Ref Range    Magnesium 2.36 1.60 - 2.40 mg/dL   Comprehensive metabolic panel   Result Value Ref Range    Glucose 160 (H) 74 - 99 mg/dL    Sodium 132 (L) 136 - 145 mmol/L    Potassium 4.3 3.5 - 5.3 mmol/L    Chloride 90 (L) 98 - 107 mmol/L    Bicarbonate 24 21 - 32 mmol/L    Anion Gap 22 (H) 10 - 20 mmol/L    Urea Nitrogen 94 (HH) 6 - 23 mg/dL    Creatinine 6.36 (H) 0.50 - 1.30 mg/dL    eGFR 9 (L) >60 mL/min/1.73m*2    Calcium 9.3 8.6 - 10.3 mg/dL    Albumin 4.0 3.4 - 5.0 g/dL    Alkaline Phosphatase 87 33 - 136 U/L    Total Protein 7.8 6.4 - 8.2 g/dL    AST 14 9 - 39 U/L    Bilirubin, Total 0.4 0.0 - 1.2 mg/dL    ALT 14 10 - 52 U/L   B-Type Natriuretic Peptide   Result Value Ref Range    BNP 23 0 - 99 pg/mL   Lactate   Result Value Ref Range    Lactate 1.3 0.4 - 2.0 mmol/L   Troponin I, High Sensitivity, Initial   Result Value Ref Range    Troponin I, High Sensitivity 11 0 - 20 ng/L   Urinalysis with Reflex Culture and Microscopic   Result Value Ref Range    Color, Urine Yellow Light-Yellow, Yellow, Dark-Yellow    Appearance, Urine Turbid (N) Clear    Specific Gravity, Urine 1.025 1.005 - 1.035    pH, Urine 5.0 5.0, 5.5, 6.0,  6.5, 7.0, 7.5, 8.0    Protein, Urine 30 (1+) (A) NEGATIVE, 10 (TRACE), 20 (TRACE) mg/dL    Glucose, Urine 30 (TRACE) (A) Normal mg/dL    Blood, Urine 0.06 (1+) (A) NEGATIVE mg/dL    Ketones, Urine NEGATIVE NEGATIVE mg/dL    Bilirubin, Urine NEGATIVE NEGATIVE mg/dL    Urobilinogen, Urine Normal Normal mg/dL    Nitrite, Urine NEGATIVE NEGATIVE    Leukocyte Esterase, Urine NEGATIVE NEGATIVE   Urinalysis Microscopic   Result Value Ref Range    WBC, Urine 11-20 (A) 1-5, NONE /HPF    WBC Clumps, Urine RARE Reference range not established. /HPF    RBC, Urine 1-2 NONE, 1-2, 3-5 /HPF    Bacteria, Urine 1+ (A) NONE SEEN /HPF    Mucus, Urine FEW Reference range not established. /LPF    Hyaline Casts, Urine 2+ (A) NONE /LPF    Fine Granular Casts, Urine 2+ (A) NONE /LPF      XR chest 1 view    Result Date: 2/5/2025  Interpreted By:  Schoenberger, Joseph, STUDY: XR CHEST 1 VIEW;  2/5/2025 10:28 am   INDICATION: Signs/Symptoms:weak.     COMPARISON: None.   ACCESSION NUMBER(S): [id number]   ORDERING CLINICIAN: KRISTEN BAEZ   FINDINGS:         CARDIOMEDIASTINAL SILHOUETTE: Cardiomediastinal silhouette is normal in size and configuration.   LUNGS: Lungs are clear.   ABDOMEN: No remarkable upper abdominal findings.   BONES: No acute osseous changes.       1.  No evidence of acute cardiopulmonary process.       MACRO: None   Signed by: Joseph Schoenberger 2/5/2025 10:30 AM Dictation workstation:   [id number]     Assessment/Plan   Assessment & Plan  CELESTE (acute kidney injury) (CMS-[facility])      Vineet Lira is a 69 y.o. male presents from nursing facility for worsening kidney function on lab work yesterday.  History somewhat limited from patient.  Does admit to occasional mild nausea, generalized abdominal pain, and decreased appetite/oral intake over the past 3-4 days.  Does report he has not been peeing as much over the past week or so.  States this has happened in the past, however is unable to tell me any more information about  it. Denies any pain with urination or bleeding.  Does report he has been feeling weaker, and of note, patient seen multiple times in the past month at [facility] for evaluation after falls. Patient currently denying any symptoms in the ED aside from mild abdominal discomfort.  States he feels like he has not had an appetite recently.     CODE STATUS: Full code    #Acute kidney injury  #Acute urinary retention  #Mild hyponatremia, 132  #Hypochloremia, 90  #Tachycardia  #Abdominal pain, nausea, decreased appetite  Admit as inpatient with telemetry monitoring  Urinalysis shows yellow and turbid appearance with 1+ protein, trace glucose, 1+ blood, negative nitrites, negative leukocyte esterase, 11-20 WBCs, 1+ bacteria, 2+ hyaline cast, 2+ granular casts  Nephrology consult  Urology consult  -BUN 90/creatinine 6.36 today, BUN 80/creatinine 6.31 on 2/4/25 (BUN 19/creatinine 0.63 on 1/12/2025)  Blood culture/urine culture pending  Continuous IV fluids, given 500 mL NS bolus in the ED  Hold off on nephrotoxic medications  Bilateral renal ultrasound ordered  CT abdomen/pelvis without contrast ordered  Urine osmolality/urine sodium ordered, CK added onto lab work  Tylenol, Zofran as needed  N.p.o. until CT abdomen/pelvis results  Q 4 vitals  CBC, BMP in the a.m.  Will start on IV Rocephin empirically until urine culture results  -Per home medication review, patient takes 100 mg losartan daily and 20 mg Lipitor daily, will hold.  Consider cardiology consult for tachycardia pending clinical course, will defer to attending.    -1605, notified by RN that CT abdomen/pelvis resulted.  Shows left femoral neck fracture likely subacute or chronic, orthopedic consultation recommended.  She has multiple vertebral body compression fractures with the L2 and T12 fracture, thought to be acute/subacute, left renal cyst.  Similar findings on CT abdomen/pelvis from 1/12 after patient was evaluated after a fall, however per my EMR review does  not appear Ortho was consulted at that time.  Will consult orthopedic surgery team for further evaluation.    #Diabetes mellitus  Sliding scale insulin  ACHS Accu-Cheks    #Thrombocytopenia  Platelets 144 today, platelets 184 on 1/12  Monitor with a.m. labs    #Generalized weakness/history of frequent falls  PT/OT for evaluation and treatment  Bed alarm, fall precautions    Continue home medications when med rec is completed    Chronic conditions:  Obesity, history of seizures, frequent falls, BPH, GERD, depression, hypothyroidism, vitamin B12 deficiency, DM, HLD, HTN    #DVT prophylaxis  SCDs, ambulation as tolerated  Heparin       I spent 45 minutes in the professional and overall care of this patient.    Liang Medina PA-C

## 2025-02-06 LAB
ALBUMIN SERPL BCP-MCNC: 3.7 G/DL (ref 3.4–5)
ANION GAP SERPL CALC-SCNC: 20 MMOL/L (ref 10–20)
ATRIAL RATE: 124 BPM
BACTERIA UR CULT: NO GROWTH
BUN SERPL-MCNC: 96 MG/DL (ref 6–23)
CALCIUM SERPL-MCNC: 8.9 MG/DL (ref 8.6–10.3)
CHLORIDE SERPL-SCNC: 91 MMOL/L (ref 98–107)
CHOLEST SERPL-MCNC: 167 MG/DL (ref 0–199)
CHOLESTEROL/HDL RATIO: 3.8
CO2 SERPL-SCNC: 22 MMOL/L (ref 21–32)
CREAT SERPL-MCNC: 4.12 MG/DL (ref 0.5–1.3)
EGFRCR SERPLBLD CKD-EPI 2021: 15 ML/MIN/1.73M*2
ERYTHROCYTE [DISTWIDTH] IN BLOOD BY AUTOMATED COUNT: 12.8 % (ref 11.5–14.5)
GLUCOSE BLD MANUAL STRIP-MCNC: 119 MG/DL (ref 74–99)
GLUCOSE BLD MANUAL STRIP-MCNC: 122 MG/DL (ref 74–99)
GLUCOSE BLD MANUAL STRIP-MCNC: 126 MG/DL (ref 74–99)
GLUCOSE BLD MANUAL STRIP-MCNC: 134 MG/DL (ref 74–99)
GLUCOSE SERPL-MCNC: 131 MG/DL (ref 74–99)
HCT VFR BLD AUTO: 36.1 % (ref 41–52)
HDLC SERPL-MCNC: 44.4 MG/DL
HGB BLD-MCNC: 12.3 G/DL (ref 13.5–17.5)
LDLC SERPL CALC-MCNC: 100 MG/DL
MCH RBC QN AUTO: 32.6 PG (ref 26–34)
MCHC RBC AUTO-ENTMCNC: 34.1 G/DL (ref 32–36)
MCV RBC AUTO: 96 FL (ref 80–100)
MYOGLOBIN SERPL-MCNC: 35 UG/L
NON HDL CHOLESTEROL: 123 MG/DL (ref 0–149)
NRBC BLD-RTO: 0 /100 WBCS (ref 0–0)
P AXIS: -5 DEGREES
PHOSPHATE SERPL-MCNC: 6.5 MG/DL (ref 2.5–4.9)
PLATELET # BLD AUTO: 133 X10*3/UL (ref 150–450)
POTASSIUM SERPL-SCNC: 4.4 MMOL/L (ref 3.5–5.3)
PR INTERVAL: 123 MS
PTH-INTACT SERPL-MCNC: 204.3 PG/ML (ref 18.5–88)
Q ONSET: 249 MS
QRS COUNT: 20 BEATS
QRS DURATION: 105 MS
QT INTERVAL: 291 MS
QTC CALCULATION(BAZETT): 420 MS
QTC FREDERICIA: 371 MS
R AXIS: -42 DEGREES
RBC # BLD AUTO: 3.77 X10*6/UL (ref 4.5–5.9)
SODIUM SERPL-SCNC: 129 MMOL/L (ref 136–145)
T AXIS: 77 DEGREES
T OFFSET: 395 MS
TRIGL SERPL-MCNC: 111 MG/DL (ref 0–149)
URATE SERPL-MCNC: 8.9 MG/DL (ref 4–7.5)
VENTRICULAR RATE: 125 BPM
VLDL: 22 MG/DL (ref 0–40)
WBC # BLD AUTO: 10.4 X10*3/UL (ref 4.4–11.3)

## 2025-02-06 PROCEDURE — 2500000002 HC RX 250 W HCPCS SELF ADMINISTERED DRUGS (ALT 637 FOR MEDICARE OP, ALT 636 FOR OP/ED): Performed by: PHYSICIAN ASSISTANT

## 2025-02-06 PROCEDURE — 82435 ASSAY OF BLOOD CHLORIDE: CPT | Performed by: INTERNAL MEDICINE

## 2025-02-06 PROCEDURE — 2500000005 HC RX 250 GENERAL PHARMACY W/O HCPCS: Performed by: PHYSICIAN ASSISTANT

## 2025-02-06 PROCEDURE — 2500000004 HC RX 250 GENERAL PHARMACY W/ HCPCS (ALT 636 FOR OP/ED): Performed by: PHYSICIAN ASSISTANT

## 2025-02-06 PROCEDURE — 97161 PT EVAL LOW COMPLEX 20 MIN: CPT | Mod: GP

## 2025-02-06 PROCEDURE — 82565 ASSAY OF CREATININE: CPT | Performed by: INTERNAL MEDICINE

## 2025-02-06 PROCEDURE — 1200000002 HC GENERAL ROOM WITH TELEMETRY DAILY

## 2025-02-06 PROCEDURE — 2500000001 HC RX 250 WO HCPCS SELF ADMINISTERED DRUGS (ALT 637 FOR MEDICARE OP): Performed by: PHYSICIAN ASSISTANT

## 2025-02-06 PROCEDURE — 82947 ASSAY GLUCOSE BLOOD QUANT: CPT

## 2025-02-06 PROCEDURE — 99231 SBSQ HOSP IP/OBS SF/LOW 25: CPT | Performed by: REGISTERED NURSE

## 2025-02-06 PROCEDURE — 2500000001 HC RX 250 WO HCPCS SELF ADMINISTERED DRUGS (ALT 637 FOR MEDICARE OP): Performed by: INTERNAL MEDICINE

## 2025-02-06 PROCEDURE — 84550 ASSAY OF BLOOD/URIC ACID: CPT | Performed by: INTERNAL MEDICINE

## 2025-02-06 PROCEDURE — 36415 COLL VENOUS BLD VENIPUNCTURE: CPT | Performed by: INTERNAL MEDICINE

## 2025-02-06 PROCEDURE — 80061 LIPID PANEL: CPT | Performed by: INTERNAL MEDICINE

## 2025-02-06 PROCEDURE — 97165 OT EVAL LOW COMPLEX 30 MIN: CPT | Mod: GO

## 2025-02-06 PROCEDURE — 83970 ASSAY OF PARATHORMONE: CPT | Mod: PARLAB | Performed by: INTERNAL MEDICINE

## 2025-02-06 PROCEDURE — 85027 COMPLETE CBC AUTOMATED: CPT | Performed by: PHYSICIAN ASSISTANT

## 2025-02-06 PROCEDURE — 83874 ASSAY OF MYOGLOBIN: CPT | Mod: PARLAB | Performed by: INTERNAL MEDICINE

## 2025-02-06 RX ORDER — METOPROLOL SUCCINATE 50 MG/1
50 TABLET, EXTENDED RELEASE ORAL 2 TIMES DAILY
Status: DISCONTINUED | OUTPATIENT
Start: 2025-02-06 | End: 2025-02-08 | Stop reason: HOSPADM

## 2025-02-06 RX ORDER — AMLODIPINE BESYLATE 5 MG/1
2.5 TABLET ORAL 2 TIMES DAILY
Status: DISCONTINUED | OUTPATIENT
Start: 2025-02-06 | End: 2025-02-07

## 2025-02-06 RX ORDER — METOPROLOL TARTRATE 100 MG/1
100 TABLET ORAL EVERY MORNING
Status: DISCONTINUED | OUTPATIENT
Start: 2025-02-06 | End: 2025-02-06

## 2025-02-06 RX ADMIN — ASPIRIN 81 MG CHEWABLE TABLET 81 MG: 81 TABLET CHEWABLE at 09:03

## 2025-02-06 RX ADMIN — AMLODIPINE BESYLATE 2.5 MG: 5 TABLET ORAL at 21:14

## 2025-02-06 RX ADMIN — PHENOBARBITAL 32.4 MG: 32.4 TABLET ORAL at 09:03

## 2025-02-06 RX ADMIN — LEVOTHYROXINE SODIUM 50 MCG: 0.05 TABLET ORAL at 06:09

## 2025-02-06 RX ADMIN — HEPARIN SODIUM 5000 UNITS: 5000 INJECTION INTRAVENOUS; SUBCUTANEOUS at 18:12

## 2025-02-06 RX ADMIN — METOPROLOL SUCCINATE 50 MG: 50 TABLET, EXTENDED RELEASE ORAL at 09:05

## 2025-02-06 RX ADMIN — AMLODIPINE BESYLATE 2.5 MG: 5 TABLET ORAL at 09:03

## 2025-02-06 RX ADMIN — SODIUM CHLORIDE 125 ML/HR: 9 INJECTION, SOLUTION INTRAVENOUS at 16:44

## 2025-02-06 RX ADMIN — CEFTRIAXONE SODIUM 1 G: 1 INJECTION, SOLUTION INTRAVENOUS at 15:04

## 2025-02-06 RX ADMIN — SODIUM CHLORIDE 125 ML/HR: 9 INJECTION, SOLUTION INTRAVENOUS at 03:01

## 2025-02-06 RX ADMIN — ARIPIPRAZOLE 5 MG: 5 TABLET ORAL at 09:03

## 2025-02-06 RX ADMIN — METOPROLOL SUCCINATE 50 MG: 50 TABLET, EXTENDED RELEASE ORAL at 21:14

## 2025-02-06 RX ADMIN — POLYVINYL ALCOHOL, POVIDONE 1 DROP: 14; 6 SOLUTION/ DROPS OPHTHALMIC at 09:04

## 2025-02-06 RX ADMIN — HEPARIN SODIUM 5000 UNITS: 5000 INJECTION INTRAVENOUS; SUBCUTANEOUS at 09:03

## 2025-02-06 RX ADMIN — HEPARIN SODIUM 5000 UNITS: 5000 INJECTION INTRAVENOUS; SUBCUTANEOUS at 06:09

## 2025-02-06 RX ADMIN — DIVALPROEX SODIUM 250 MG: 250 TABLET, DELAYED RELEASE ORAL at 09:03

## 2025-02-06 RX ADMIN — DIVALPROEX SODIUM 250 MG: 250 TABLET, DELAYED RELEASE ORAL at 21:14

## 2025-02-06 RX ADMIN — TAMSULOSIN HYDROCHLORIDE 0.4 MG: 0.4 CAPSULE ORAL at 09:03

## 2025-02-06 RX ADMIN — POLYVINYL ALCOHOL, POVIDONE 1 DROP: 14; 6 SOLUTION/ DROPS OPHTHALMIC at 21:14

## 2025-02-06 RX ADMIN — CETIRIZINE HYDROCHLORIDE 10 MG: 10 TABLET, FILM COATED ORAL at 09:03

## 2025-02-06 RX ADMIN — PHENOBARBITAL 64.8 MG: 32.4 TABLET ORAL at 21:14

## 2025-02-06 ASSESSMENT — PAIN - FUNCTIONAL ASSESSMENT
PAIN_FUNCTIONAL_ASSESSMENT: 0-10
PAIN_FUNCTIONAL_ASSESSMENT: 0-10

## 2025-02-06 ASSESSMENT — COGNITIVE AND FUNCTIONAL STATUS - GENERAL
STANDING UP FROM CHAIR USING ARMS: TOTAL
DRESSING REGULAR UPPER BODY CLOTHING: A LOT
HELP NEEDED FOR BATHING: A LOT
CLIMB 3 TO 5 STEPS WITH RAILING: A LOT
WALKING IN HOSPITAL ROOM: TOTAL
MOVING FROM LYING ON BACK TO SITTING ON SIDE OF FLAT BED WITH BEDRAILS: A LOT
STANDING UP FROM CHAIR USING ARMS: A LOT
DRESSING REGULAR UPPER BODY CLOTHING: A LOT
MOVING TO AND FROM BED TO CHAIR: TOTAL
TURNING FROM BACK TO SIDE WHILE IN FLAT BAD: A LOT
WALKING IN HOSPITAL ROOM: A LOT
CLIMB 3 TO 5 STEPS WITH RAILING: TOTAL
DAILY ACTIVITIY SCORE: 12
MOBILITY SCORE: 8
PERSONAL GROOMING: A LOT
MOBILITY SCORE: 12
TOILETING: TOTAL
DRESSING REGULAR LOWER BODY CLOTHING: A LOT
MOVING TO AND FROM BED TO CHAIR: A LOT
HELP NEEDED FOR BATHING: A LOT
TOILETING: A LOT
TURNING FROM BACK TO SIDE WHILE IN FLAT BAD: A LOT
MOVING FROM LYING ON BACK TO SITTING ON SIDE OF FLAT BED WITH BEDRAILS: A LOT
EATING MEALS: A LOT
DAILY ACTIVITIY SCORE: 11
DRESSING REGULAR LOWER BODY CLOTHING: TOTAL
EATING MEALS: A LITTLE
PERSONAL GROOMING: A LOT

## 2025-02-06 ASSESSMENT — PAIN SCALES - GENERAL
PAINLEVEL_OUTOF10: 8
PAINLEVEL_OUTOF10: 0 - NO PAIN
PAINLEVEL_OUTOF10: 0 - NO PAIN

## 2025-02-06 NOTE — PROGRESS NOTES
"Vineet Lira is a 69 y.o. male on day 1 of admission presenting with CELESTE (acute kidney injury) (CMS-[facility]).    Subjective   Patient denies any focal complaints.        Objective     Physical Exam  Vitals reviewed.   Cardiovascular:      Rate and Rhythm: Normal rate.   Pulmonary:      Effort: Pulmonary effort is normal. No respiratory distress.   Abdominal:      General: Bowel sounds are normal. There is no distension.      Palpations: Abdomen is soft.   Genitourinary:     Penis: Normal.       Comments: Caldwell catheter- initially NO STAT lock was in place- I personally placed securement device. Clear yellow urine. No sediment. Not cloudy. 24 hour urine collection ongoing.   Musculoskeletal:      Right lower leg: Edema present.      Left lower leg: Edema present.      Comments: SCDs. 1+ pitting BLE   Skin:     General: Skin is warm.      Coloration: Skin is not jaundiced.   Neurological:      Mental Status: He is alert.         Last Recorded Vitals  Blood pressure 114/58, pulse 97, temperature 36.3 °C (97.3 °F), resp. rate 20, height 1.778 m (5' 10\"), weight 113 kg (249 lb), SpO2 94%.  Intake/Output last 3 Shifts:  I/O last 3 completed shifts:  In: 50 (0.4 mL/kg) [IV Piggyback:50]  Out: 1300 (11.5 mL/kg) [Urine:1300 (0.3 mL/kg/hr)]  Weight: 112.9 kg     Relevant Results  Results for orders placed or performed during the hospital encounter of 02/05/25 (from the past 24 hours)   Troponin, High Sensitivity, 1 Hour   Result Value Ref Range    Troponin I, High Sensitivity 12 0 - 20 ng/L   Valproic acid level, total   Result Value Ref Range    Valproic Acid 24 (L) 50 - 100 ug/mL   POCT GLUCOSE   Result Value Ref Range    POCT Glucose 118 (H) 74 - 99 mg/dL   POCT GLUCOSE   Result Value Ref Range    POCT Glucose 155 (H) 74 - 99 mg/dL   CBC   Result Value Ref Range    WBC 10.4 4.4 - 11.3 x10*3/uL    nRBC 0.0 0.0 - 0.0 /100 WBCs    RBC 3.77 (L) 4.50 - 5.90 x10*6/uL    Hemoglobin 12.3 (L) 13.5 - 17.5 g/dL    Hematocrit 36.1 (L) " 41.0 - 52.0 %    MCV 96 80 - 100 fL    MCH 32.6 26.0 - 34.0 pg    MCHC 34.1 32.0 - 36.0 g/dL    RDW 12.8 11.5 - 14.5 %    Platelets 133 (L) 150 - 450 x10*3/uL   Uric Acid   Result Value Ref Range    Uric Acid 8.9 (H) 4.0 - 7.5 mg/dL   Lipid Panel   Result Value Ref Range    Cholesterol 167 0 - 199 mg/dL    HDL-Cholesterol 44.4 mg/dL    Cholesterol/HDL Ratio 3.8     LDL Calculated 100 (H) <=99 mg/dL    VLDL 22 0 - 40 mg/dL    Triglycerides 111 0 - 149 mg/dL    Non HDL Cholesterol 123 0 - 149 mg/dL   Renal Function Panel   Result Value Ref Range    Glucose 131 (H) 74 - 99 mg/dL    Sodium 129 (L) 136 - 145 mmol/L    Potassium 4.4 3.5 - 5.3 mmol/L    Chloride 91 (L) 98 - 107 mmol/L    Bicarbonate 22 21 - 32 mmol/L    Anion Gap 20 10 - 20 mmol/L    Urea Nitrogen 96 (HH) 6 - 23 mg/dL    Creatinine 4.12 (H) 0.50 - 1.30 mg/dL    eGFR 15 (L) >60 mL/min/1.73m*2    Calcium 8.9 8.6 - 10.3 mg/dL    Phosphorus 6.5 (H) 2.5 - 4.9 mg/dL    Albumin 3.7 3.4 - 5.0 g/dL   POCT GLUCOSE   Result Value Ref Range    POCT Glucose 134 (H) 74 - 99 mg/dL   POCT GLUCOSE   Result Value Ref Range    POCT Glucose 126 (H) 74 - 99 mg/dL         Assessment/Plan   Assessment & Plan  CELESTE (acute kidney injury) (CMS-[facility])    70 yo male presented to [facility] ED on 2/5 from [facility] due to worsening renal function on labs. Patient with acute urinary retention (Bladder scan 477; initial output after Gonzales placement: 500). Also with CELESTE: BUN 80/creatinine 6.31 on 2/4/25 (BUN 19/creatinine 0.63 on 1/12/2025) with unclear etiology. Pt experiencing LUTS for the past few years. UA with 1+ bacteria, 11-20 WBC, but negative leuk esterase/nitrates. Urine and blood cx pending. CT A/P with left renal cyst and unremarkable Renal US.      - Maintain gonzales catheter for decompression; should be discharged with Gonzales  - Continue Flomax 0.4mg   - Nephrology consult; daily RFP to trend creatinine: 4.12 today.        I spent 20 minutes in the professional and overall care of this  patient.      Renetta Bellamy, APRN-CNP

## 2025-02-06 NOTE — H&P
History Of Present Illness  Vineet Lira is a 69 y.o. male with past medical history significant for obesity, seizures, frequent falls, BPH, GERD, vitamin B12 deficiency, depression, hypothyroidism, HLD, HTN, and DM who presents from nursing facility for worsening kidney function on lab work yesterday.  History somewhat limited from patient.  Does admit to occasional mild nausea, generalized abdominal pain, and decreased appetite/oral intake over the past 3-4 days.  Does report he has not been peeing as much over the past week or so.  States this has happened in the past, however is unable to tell me any more information about it. Denies any pain with urination or bleeding.  Does report he has been feeling weaker, and of note, patient seen multiple times in the past month at [facility] for evaluation after falls.  Per EMR review, imaging from 1/3 shows no acute processes.  CT head and C-spine imaging from 1/12 are unremarkable.  CT T-spine/L-spine shows prominent osteopenia, interval progression of loss of height of L2 vertebral body, this is subacute/chronic.  CT chest/abdomen/pelvis from 1/12 shows subacute to chronic appearing fracture of left femoral neck, fracture deformities of the spine, no other potentially acute traumatic injuries.  Patient currently denying any symptoms in the ED aside from mild abdominal discomfort.  States he feels like he has not had an appetite recently.  Denies headaches, dizziness, chest pains, shortness of breath, upper respiratory symptoms, vomiting, or fever/chills.  Patient's PCP is Dr. Marshal Jackson.  Denies taking any recent ibuprofen.  Denies any changes in medications.    ED course: On arrival to the ED, patient afebrile, tachycardic with heart rate 130, otherwise hemodynamic stable with SpO2 94% on room air. Glucose 160, sodium 132, chloride 90, BUN 94/creatinine 6.36.  Magnesium 2.36, lactate 1.3, BNP 23, troponin 11.  WBC 11.3.  Hemoglobin 13.6/hematocrit 39.4, platelets  144.  Chest x-ray shows no evidence of acute cardiopulmonary process. Patient had a bladder scan done in the ED which revealed 470 mL in the bladder, Caldwell catheter placed and urinalysis/culture ordered. Urinalysis shows yellow and turbid appearance with 1+ protein, trace glucose, 1+ blood, negative nitrites, negative leukocyte esterase, 11-20 WBCs, 1+ bacteria, 2+ hyaline cast, 2+ granular casts.  Patient given 500 mL NS bolus in the ED.    EKG (interpreted by ED physician): Sinus tachycardia, rate 125 bpm, AR interval 123, , QTc 420.  No acute injury pattern.    Admitting providers Dr. Jackson     Past Medical History  Past Medical History:   Diagnosis Date    Dry eye syndrome of bilateral lacrimal glands     Dry eyes    Other conditions influencing health status     Abdominal Paracentesis (Diagnostic)    Personal history of other diseases of the circulatory system     History of hypertension    Personal history of other diseases of the musculoskeletal system and connective tissue     History of osteoporosis    Personal history of other endocrine, nutritional and metabolic disease     History of high cholesterol    Personal history of other specified conditions     History of seizure    Unspecified fracture of right femur, initial encounter for closed fracture (Multi)     Femur fracture, right    Ventricular premature depolarization     Premature ventricular contraction     Surgical History  Past Surgical History:   Procedure Laterality Date    OTHER SURGICAL HISTORY  04/11/2018    Reported Hx Of Hip Replacement    OTHER SURGICAL HISTORY  12/01/2021    Complete colonoscopy    OTHER SURGICAL HISTORY  12/01/2021    Femur fracture repair    OTHER SURGICAL HISTORY  12/01/2021    Surgery    OTHER SURGICAL HISTORY  12/01/2021    Tonsillectomy     Social History  He reports that he has never smoked. He has never been exposed to tobacco smoke. He has never used smokeless tobacco. He reports that he does not drink  "alcohol and does not use drugs.    Family History  No family history on file.     Allergies  Hydantoins, Pseudoephedrine, Diluent for epoprostenol (gly), Neomycin, Phenytoin, Metoclopramide, Cefazolin, and Codeine    Review of Systems  10 point review of system negative except as noted above in HPI    Physical Exam  Constitutional:       General: He is not in acute distress.     Appearance: He is obese.   HENT:      Head: Normocephalic and atraumatic.      Mouth/Throat:      Mouth: Mucous membranes are dry.      Pharynx: Oropharynx is clear.   Eyes:      Extraocular Movements: Extraocular movements intact.      Conjunctiva/sclera: Conjunctivae normal.      Pupils: Pupils are equal, round, and reactive to light.   Cardiovascular:      Rate and Rhythm: Regular rhythm. Tachycardia present.      Pulses: Normal pulses.      Heart sounds: Normal heart sounds.   Pulmonary:      Effort: Pulmonary effort is normal. No respiratory distress.      Breath sounds: Normal breath sounds. No wheezing.   Abdominal:      General: Bowel sounds are normal.      Palpations: Abdomen is soft.      Tenderness: There is abdominal tenderness (Generalized abdominal tenderness with palpation).   Genitourinary:     Comments: Caldwell catheter in place, good urine output, dark in color.  Musculoskeletal:      Right lower leg: Edema present.      Left lower leg: Edema present.   Skin:     General: Skin is warm and dry.   Neurological:      Mental Status: He is alert. Mental status is at baseline.      Motor: Weakness present.   Psychiatric:         Mood and Affect: Mood normal.         Behavior: Behavior normal.       Last Recorded Vitals  Blood pressure 95/53, pulse (!) 124, temperature 36.6 °C (97.9 °F), temperature source Temporal, resp. rate 20, height 1.778 m (5' 10\"), weight 113 kg (249 lb), SpO2 92%.    Relevant Results  Results for orders placed or performed during the hospital encounter of 02/05/25 (from the past 24 hours)   CBC and Auto " Differential   Result Value Ref Range    WBC 11.3 4.4 - 11.3 x10*3/uL    nRBC 0.0 0.0 - 0.0 /100 WBCs    RBC 4.17 (L) 4.50 - 5.90 x10*6/uL    Hemoglobin 13.6 13.5 - 17.5 g/dL    Hematocrit 39.4 (L) 41.0 - 52.0 %    MCV 95 80 - 100 fL    MCH 32.6 26.0 - 34.0 pg    MCHC 34.5 32.0 - 36.0 g/dL    RDW 12.9 11.5 - 14.5 %    Platelets 144 (L) 150 - 450 x10*3/uL    Neutrophils % 77.2 40.0 - 80.0 %    Immature Granulocytes %, Automated 0.8 0.0 - 0.9 %    Lymphocytes % 12.2 13.0 - 44.0 %    Monocytes % 9.0 2.0 - 10.0 %    Eosinophils % 0.5 0.0 - 6.0 %    Basophils % 0.3 0.0 - 2.0 %    Neutrophils Absolute 8.75 (H) 1.20 - 7.70 x10*3/uL    Immature Granulocytes Absolute, Automated 0.09 0.00 - 0.70 x10*3/uL    Lymphocytes Absolute 1.38 1.20 - 4.80 x10*3/uL    Monocytes Absolute 1.02 (H) 0.10 - 1.00 x10*3/uL    Eosinophils Absolute 0.06 0.00 - 0.70 x10*3/uL    Basophils Absolute 0.03 0.00 - 0.10 x10*3/uL   Magnesium   Result Value Ref Range    Magnesium 2.36 1.60 - 2.40 mg/dL   Comprehensive metabolic panel   Result Value Ref Range    Glucose 160 (H) 74 - 99 mg/dL    Sodium 132 (L) 136 - 145 mmol/L    Potassium 4.3 3.5 - 5.3 mmol/L    Chloride 90 (L) 98 - 107 mmol/L    Bicarbonate 24 21 - 32 mmol/L    Anion Gap 22 (H) 10 - 20 mmol/L    Urea Nitrogen 94 (HH) 6 - 23 mg/dL    Creatinine 6.36 (H) 0.50 - 1.30 mg/dL    eGFR 9 (L) >60 mL/min/1.73m*2    Calcium 9.3 8.6 - 10.3 mg/dL    Albumin 4.0 3.4 - 5.0 g/dL    Alkaline Phosphatase 87 33 - 136 U/L    Total Protein 7.8 6.4 - 8.2 g/dL    AST 14 9 - 39 U/L    Bilirubin, Total 0.4 0.0 - 1.2 mg/dL    ALT 14 10 - 52 U/L   B-Type Natriuretic Peptide   Result Value Ref Range    BNP 23 0 - 99 pg/mL   Lactate   Result Value Ref Range    Lactate 1.3 0.4 - 2.0 mmol/L   Troponin I, High Sensitivity, Initial   Result Value Ref Range    Troponin I, High Sensitivity 11 0 - 20 ng/L   Creatine Kinase   Result Value Ref Range    Creatine Kinase 15 0 - 325 U/L   Urinalysis with Reflex Culture and  Microscopic   Result Value Ref Range    Color, Urine Yellow Light-Yellow, Yellow, Dark-Yellow    Appearance, Urine Turbid (N) Clear    Specific Gravity, Urine 1.025 1.005 - 1.035    pH, Urine 5.0 5.0, 5.5, 6.0, 6.5, 7.0, 7.5, 8.0    Protein, Urine 30 (1+) (A) NEGATIVE, 10 (TRACE), 20 (TRACE) mg/dL    Glucose, Urine 30 (TRACE) (A) Normal mg/dL    Blood, Urine 0.06 (1+) (A) NEGATIVE mg/dL    Ketones, Urine NEGATIVE NEGATIVE mg/dL    Bilirubin, Urine NEGATIVE NEGATIVE mg/dL    Urobilinogen, Urine Normal Normal mg/dL    Nitrite, Urine NEGATIVE NEGATIVE    Leukocyte Esterase, Urine NEGATIVE NEGATIVE   Urinalysis Microscopic   Result Value Ref Range    WBC, Urine 11-20 (A) 1-5, NONE /HPF    WBC Clumps, Urine RARE Reference range not established. /HPF    RBC, Urine 1-2 NONE, 1-2, 3-5 /HPF    Bacteria, Urine 1+ (A) NONE SEEN /HPF    Mucus, Urine FEW Reference range not established. /LPF    Hyaline Casts, Urine 2+ (A) NONE /LPF    Fine Granular Casts, Urine 2+ (A) NONE /LPF   Osmolality, urine   Result Value Ref Range    Osmolality, Urine Random 322 200 - 1,200 mOsm/kg   Sodium, Urine Random   Result Value Ref Range    Sodium, Urine Random 12 mmol/L    Creatinine, Urine Random 298.0 20.0 - 370.0 mg/dL    Sodium/Creatinine Ratio 4 Not established. mmol/g Creat   Troponin, High Sensitivity, 1 Hour   Result Value Ref Range    Troponin I, High Sensitivity 12 0 - 20 ng/L   Valproic acid level, total   Result Value Ref Range    Valproic Acid 24 (L) 50 - 100 ug/mL   POCT GLUCOSE   Result Value Ref Range    POCT Glucose 118 (H) 74 - 99 mg/dL      XR chest 1 view    Result Date: 2/5/2025  Interpreted By:  Schoenberger, Joseph, STUDY: XR CHEST 1 VIEW;  2/5/2025 10:28 am   INDICATION: Signs/Symptoms:weak.     COMPARISON: None.   ACCESSION NUMBER(S): [id number]   ORDERING CLINICIAN: KRISTEN BAEZ   FINDINGS:         CARDIOMEDIASTINAL SILHOUETTE: Cardiomediastinal silhouette is normal in size and configuration.   LUNGS: Lungs are  clear.   ABDOMEN: No remarkable upper abdominal findings.   BONES: No acute osseous changes.       1.  No evidence of acute cardiopulmonary process.       MACRO: None   Signed by: Joseph Schoenberger 2/5/2025 10:30 AM Dictation workstation:   [id number]     Assessment/Plan   Assessment & Plan  CELESTE (acute kidney injury) (CMS-HCC)      Vineet Lira is a 69 y.o. male presents from nursing facility for worsening kidney function on lab work yesterday.  History somewhat limited from patient.  Does admit to occasional mild nausea, generalized abdominal pain, and decreased appetite/oral intake over the past 3-4 days.  Does report he has not been peeing as much over the past week or so.  States this has happened in the past, however is unable to tell me any more information about it. Denies any pain with urination or bleeding.  Does report he has been feeling weaker, and of note, patient seen multiple times in the past month at [facility] for evaluation after falls. Patient currently denying any symptoms in the ED aside from mild abdominal discomfort.  States he feels like he has not had an appetite recently.     CODE STATUS: Full code    #Acute kidney injury  #Acute urinary retention  #Mild hyponatremia, 132  #Hypochloremia, 90  #Tachycardia  #Abdominal pain, nausea, decreased appetite  Admit as inpatient with telemetry monitoring  Urinalysis shows yellow and turbid appearance with 1+ protein, trace glucose, 1+ blood, negative nitrites, negative leukocyte esterase, 11-20 WBCs, 1+ bacteria, 2+ hyaline cast, 2+ granular casts  Nephrology consult  Urology consult  -BUN 90/creatinine 6.36 today, BUN 80/creatinine 6.31 on 2/4/25 (BUN 19/creatinine 0.63 on 1/12/2025)  Blood culture/urine culture pending  Continuous IV fluids, given 500 mL NS bolus in the ED  Hold off on nephrotoxic medications  Bilateral renal ultrasound ordered  CT abdomen/pelvis without contrast ordered  Urine osmolality/urine sodium ordered, CK added onto  lab work  Tylenol, Zofran as needed  N.p.o. until CT abdomen/pelvis results  Q 4 vitals  CBC, BMP in the a.m.  Will start on IV Rocephin empirically until urine culture results  -Per home medication review, patient takes 100 mg losartan daily and 20 mg Lipitor daily, will hold.  Consider cardiology consult for tachycardia pending clinical course, will defer to attending.    -1605, notified by RN that CT abdomen/pelvis resulted.  Shows left femoral neck fracture likely subacute or chronic, orthopedic consultation recommended.  She has multiple vertebral body compression fractures with the L2 and T12 fracture, thought to be acute/subacute, left renal cyst.  Similar findings on CT abdomen/pelvis from 1/12 after patient was evaluated after a fall, however per my EMR review does not appear Ortho was consulted at that time.  Will consult orthopedic surgery team for further evaluation.    #Diabetes mellitus  Sliding scale insulin  ACHS Accu-Cheks    #Thrombocytopenia  Platelets 144 today, platelets 184 on 1/12  Monitor with a.m. labs    #Generalized weakness/history of frequent falls  PT/OT for evaluation and treatment  Bed alarm, fall precautions    Continue home medications when med rec is completed    Chronic conditions:  Obesity, history of seizures, frequent falls, BPH, GERD, depression, hypothyroidism, vitamin B12 deficiency, DM, HLD, HTN    #DVT prophylaxis  SCDs, ambulation as tolerated  Heparin       I spent 45 minutes in the professional and overall care of this patient.    Kyle Jackson, DO

## 2025-02-06 NOTE — PROGRESS NOTES
Physical Therapy    Physical Therapy Evaluation    Patient Name: Vineet Lira  MRN: [id number]  Today's Date: 2/6/2025   Time Calculation  Start Time: 0937  Stop Time: 0956  Time Calculation (min): 19 min  610/610-A    Assessment/Plan   PT Assessment  PT Assessment Results: Decreased strength, Decreased endurance, Impaired balance, Decreased mobility  End of Session Communication: Bedside nurse  End of Session Patient Position: Alarm on, Bed, 3 rail up (All needs in reach and no complaints noted)  IP OR SWING BED PT PLAN  Inpatient or Swing Bed: Inpatient  PT Plan  Treatment/Interventions: Bed mobility, Transfer training, Gait training  PT Plan: Ongoing PT  PT Frequency: 3 times per week  PT Discharge Recommendations: Moderate intensity level of continued care  PT - OK to Discharge: Yes    Subjective     Current Problem:  1. CELESTE (acute kidney injury) (CMS-HCC)  CBC and Auto Differential    Magnesium    Comprehensive metabolic panel    B-Type Natriuretic Peptide    ECG 12 Lead    XR chest 1 view    Insert and maintain peripheral IV    sodium chloride 0.9 % bolus 500 mL    CBC and Auto Differential    Magnesium    Comprehensive metabolic panel    B-Type Natriuretic Peptide    ECG 12 Lead    XR chest 1 view    Insert and maintain peripheral IV    Urinalysis with Reflex Culture and Microscopic    Urinalysis with Reflex Culture and Microscopic    Lactate    Blood Culture    Blood Culture    Troponin Series, (0, 1 HR)    Lactate    Blood Culture    Blood Culture    Troponin Series, (0, 1 HR)    Bladder scan    Bladder scan    Urinalysis Microscopic    Urinalysis Microscopic    Urine Culture    Urine Culture      2. Urinary retention  CBC and Auto Differential    Magnesium    Comprehensive metabolic panel    B-Type Natriuretic Peptide    ECG 12 Lead    XR chest 1 view    Insert and maintain peripheral IV    sodium chloride 0.9 % bolus 500 mL    CBC and Auto Differential    Magnesium    Comprehensive metabolic panel     B-Type Natriuretic Peptide    ECG 12 Lead    XR chest 1 view    Insert and maintain peripheral IV    Urinalysis with Reflex Culture and Microscopic    Urinalysis with Reflex Culture and Microscopic    Lactate    Blood Culture    Blood Culture    Troponin Series, (0, 1 HR)    Lactate    Blood Culture    Blood Culture    Troponin Series, (0, 1 HR)    Bladder scan    Bladder scan    Urinalysis Microscopic    Urinalysis Microscopic    Urine Culture    Urine Culture        Patient Active Problem List   Diagnosis    Benign essential tremor    Essential hypertension, benign    Partial symptomatic epilepsy with complex partial seizures, not intractable, without status epilepticus (Multi)    Seizure disorder (Multi)    Weakness    Palpitations    CELESTE (acute kidney injury) (CMS-HCC)     General Visit Information:  General  Reason for Referral: PT Eval and Treat  Referred By: Liang Medina PA-C  Past Medical History Relevant to Rehab: 69 y.o. male with past medical history significant for obesity, seizures, frequent falls, BPH, GERD, vitamin B12 deficiency, depression, hypothyroidism, HLD, HTN, and DM who presents from nursing facility for worsening kidney function on lab work yesterday.  History somewhat limited from patient.  Does admit to occasional mild nausea, generalized abdominal pain, and decreased appetite/oral intake over the past 3-4 days.  Does report he has not been peeing as much over the past week or so.  States this has happened in the past, however is unable to tell me any more information about it. Denies any pain with urination or bleeding.  Co-Treatment: OT  Co-Treatment Reason: maximize safety and functional mobility  Prior to Session Communication: Bedside nurse  Patient Position Received: Alarm on, Bed, 4 rail up    Home Living:  Home Living  Home Living Comments: Patient unable to provide baseline functional status secondary to impaired cognition; Per EMR, patient admitted from [facility], was getting  therapy, walking with walker.    Precautions:  Precautions  Precautions Comment: Fall precautions    Objective     Pain:  Pain Assessment  Pain Assessment:  (8/10 chronic L LE, repositioned for comfort s/p session)    Cognition:  Cognition  Overall Cognitive Status: Impaired (Oriented to self only; poor historian, confused, distractible, follows one step commands with mod cues)    General Assessments:  Sensation  Light Touch: No apparent deficits  Strength  Strength Comments: B LE ROM WFL, B LE strength 3-/5  Static Sitting Balance  Static Sitting-Comment/Number of Minutes: Fair- with CGA x 2 min     Functional Assessments:  Bed Mobility  Bed Mobility:  (supine <> sitting: max A x 2, pt was able to sit EOB with CGA x 2 min, however was unable to tolerate further activity due to unresolving dizziness)  Transfers  Transfer:  (unable to attempt at this time due to dizziness, weakness, and high fall risk)    Outcome Measures:  [facility] Basic Mobility  Turning from your back to your side while in a flat bed without using bedrails: A lot  Moving from lying on your back to sitting on the side of a flat bed without using bedrails: A lot  Moving to and from bed to chair (including a wheelchair): Total  Standing up from a chair using your arms (e.g. wheelchair or bedside chair): Total  To walk in hospital room: Total  Climbing 3-5 steps with railing: Total  Basic Mobility - Total Score: 8     Goals:  Encounter Problems       Encounter Problems (Active)       PT Problem       STG - Pt will transition supine <> sitting with mod A x 1  (Progressing)       Start:  02/06/25    Expected End:  02/20/25            STG - Pt will transfer STS with mod A x 2 (Progressing)       Start:  02/06/25    Expected End:  02/20/25            STG - Pt will amb 5' using RW with mod A x 2  (Progressing)       Start:  02/06/25    Expected End:  02/20/25               Pain - Adult            Education Documentation  Precautions, taught by Julianna Ruelas  PT at 2/6/2025  1:21 PM.  Learner: Patient  Readiness: Acceptance  Method: Explanation  Response: Needs Reinforcement    Mobility Training, taught by Julianna Ruelas, PT at 2/6/2025  1:21 PM.  Learner: Patient  Readiness: Acceptance  Method: Explanation  Response: Needs Reinforcement    Education Comments  No comments found.

## 2025-02-06 NOTE — CARE PLAN
Problem: Pain - Adult  Goal: Verbalizes/displays adequate comfort level or baseline comfort level  Outcome: Progressing     Problem: Safety - Adult  Goal: Free from fall injury  Outcome: Progressing     Problem: Discharge Planning  Goal: Discharge to home or other facility with appropriate resources  Outcome: Progressing     Problem: Chronic Conditions and Co-morbidities  Goal: Patient's chronic conditions and co-morbidity symptoms are monitored and maintained or improved  Outcome: Progressing     Problem: Nutrition  Goal: Nutrient intake appropriate for maintaining nutritional needs  Outcome: Progressing     Problem: Skin  Goal: Decreased wound size/increased tissue granulation at next dressing change  Outcome: Progressing  Goal: Participates in plan/prevention/treatment measures  Outcome: Progressing  Goal: Prevent/manage excess moisture  Outcome: Progressing  Goal: Prevent/minimize sheer/friction injuries  Outcome: Progressing  Goal: Promote/optimize nutrition  Outcome: Progressing  Goal: Promote skin healing  Outcome: Progressing     Problem: Pain  Goal: Takes deep breaths with improved pain control throughout the shift  Outcome: Progressing  Goal: Turns in bed with improved pain control throughout the shift  Outcome: Progressing  Goal: Walks with improved pain control throughout the shift  Outcome: Progressing  Goal: Performs ADL's with improved pain control throughout shift  Outcome: Progressing  Goal: Participates in PT with improved pain control throughout the shift  Outcome: Progressing  Goal: Free from opioid side effects throughout the shift  Outcome: Progressing  Goal: Free from acute confusion related to pain meds throughout the shift  Outcome: Progressing

## 2025-02-06 NOTE — CONSULTS
Reason For Consult  Acute kidney injury in the face of obstructive uropathy    History Of Present Illness  Vineet Lira is a 69 y.o. 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  Patient have a normal creatinine a month ago now presented with a creatinine of 6.36 and a BUN of 94 urinary retention of 470 cc after bladder scan patient denies metallic taste hematuria or dysuria.  Urinalysis on admission disclose a   urine sediment and urinalysis as follows;  14-20 WBC per high-power field   1-2 RBCs per high-power field  Dipstick showed 1+ blood  1+ protein trace glucose pH of 5    Specific gravity 1.70  pH 5   urinary indices urinary sodium was 12  Fractional excretion of sodium was 0.19% consistent with prerenal azotemia    Patient received fluid resuscitation.  And potential nephrotoxins are being on hold  Patient was taking nonsteroidal anti-inflammatory drug Naprosyn, Crestor, metformin, Nexium, and his medications for seizures.       He denies past history of prostatic issues, hematuria or dysuria     Past Medical History  He has a past medical history of Dry eye syndrome of bilateral lacrimal glands, Other conditions influencing health status, Personal history of other diseases of the circulatory system, Personal history of other diseases of the musculoskeletal system and connective tissue, Personal history of other endocrine, nutritional and metabolic disease, Personal history of other specified conditions, Unspecified fracture of right femur, initial encounter for closed fracture (Multi), and Ventricular premature depolarization.   Hypothyroidism   hyperlipidemia   hypertension  Keratoconjunctivitis sicca  Hyponatremia secondary to SIADH  Hyperlipidemia  Lung nodule    Surgical History  He has a past surgical history that includes Other surgical history  (04/11/2018); Other surgical history (12/01/2021); Other surgical history (12/01/2021); Other surgical history (12/01/2021); and Other surgical history (12/01/2021).     Social History  He reports that he has never smoked. He has never been exposed to tobacco smoke. He has never used smokeless tobacco. He reports that he does not drink alcohol and does not use drugs.    Family History  No family history on file.     Allergies  Hydantoins, Pseudoephedrine, Diluent for epoprostenol (gly), Neomycin, Phenytoin, Metoclopramide, Cefazolin, and Codeine    Review of Systems  HEENT; dry eyes  Cardiac; no palpitations no chest pain no edema  Pulmonary; denies hemoptysis or dyspnea  GI; positive for reflux ; nausea denies melena or hematochezia   ; see present illness  Musculoskeletal; positive for weakness bilateral joint pains  Neurological; positive for seizures       Physical Exam  General Appearance; alert oriented  HEENT; pupils react to light and accommodation  Neck; no JVD no bruit no thyromegaly no cervical adenopathy  Lungs; clear to auscultation and percussion  Heart; regular rate no gallops or rubs mild tachycardia  Abdomen; active peristalsis no rebound guarding organomegaly or shifting dullness mild suprapubic tenderness  Extremities no edema  Neurological; decreased muscle tone no tremors or clonus  ;       I&O 24HR    Intake/Output Summary (Last 24 hours) at 2/6/2025 1317  Last data filed at 2/6/2025 0600  Gross per 24 hour   Intake 50 ml   Output 800 ml   Net -750 ml       Vitals 24HR  Heart Rate:  []   Temp:  [36.3 °C (97.3 °F)-36.9 °C (98.4 °F)]   Resp:  [14-27]   BP: ()/(53-73)   SpO2:  [86 %-97 %]         Relevant Results  Results for orders placed or performed during the hospital encounter of 02/05/25 (from the past 24 hours)   Troponin, High Sensitivity, 1 Hour   Result Value Ref Range    Troponin I, High Sensitivity 12 0 - 20 ng/L   Valproic acid level, total   Result Value Ref Range     Valproic Acid 24 (L) 50 - 100 ug/mL   POCT GLUCOSE   Result Value Ref Range    POCT Glucose 118 (H) 74 - 99 mg/dL   POCT GLUCOSE   Result Value Ref Range    POCT Glucose 155 (H) 74 - 99 mg/dL   CBC   Result Value Ref Range    WBC 10.4 4.4 - 11.3 x10*3/uL    nRBC 0.0 0.0 - 0.0 /100 WBCs    RBC 3.77 (L) 4.50 - 5.90 x10*6/uL    Hemoglobin 12.3 (L) 13.5 - 17.5 g/dL    Hematocrit 36.1 (L) 41.0 - 52.0 %    MCV 96 80 - 100 fL    MCH 32.6 26.0 - 34.0 pg    MCHC 34.1 32.0 - 36.0 g/dL    RDW 12.8 11.5 - 14.5 %    Platelets 133 (L) 150 - 450 x10*3/uL   Uric Acid   Result Value Ref Range    Uric Acid 8.9 (H) 4.0 - 7.5 mg/dL   Lipid Panel   Result Value Ref Range    Cholesterol 167 0 - 199 mg/dL    HDL-Cholesterol 44.4 mg/dL    Cholesterol/HDL Ratio 3.8     LDL Calculated 100 (H) <=99 mg/dL    VLDL 22 0 - 40 mg/dL    Triglycerides 111 0 - 149 mg/dL    Non HDL Cholesterol 123 0 - 149 mg/dL   Renal Function Panel   Result Value Ref Range    Glucose 131 (H) 74 - 99 mg/dL    Sodium 129 (L) 136 - 145 mmol/L    Potassium 4.4 3.5 - 5.3 mmol/L    Chloride 91 (L) 98 - 107 mmol/L    Bicarbonate 22 21 - 32 mmol/L    Anion Gap 20 10 - 20 mmol/L    Urea Nitrogen 96 (HH) 6 - 23 mg/dL    Creatinine 4.12 (H) 0.50 - 1.30 mg/dL    eGFR 15 (L) >60 mL/min/1.73m*2    Calcium 8.9 8.6 - 10.3 mg/dL    Phosphorus 6.5 (H) 2.5 - 4.9 mg/dL    Albumin 3.7 3.4 - 5.0 g/dL   POCT GLUCOSE   Result Value Ref Range    POCT Glucose 134 (H) 74 - 99 mg/dL   POCT GLUCOSE   Result Value Ref Range    POCT Glucose 126 (H) 74 - 99 mg/dL     ECG 12 Lead    Result Date: 2/6/2025  Sinus tachycardia Probable left atrial enlargement Left anterior fascicular block LVH with secondary repolarization abnormality Borderline ST elevation, lateral leads    XR hip left with pelvis when performed 2 or 3 views    Result Date: 2/6/2025  Interpreted By:  Jose M Lo, STUDY: XR HIP LEFT WITH PELVIS WHEN PERFORMED 2 OR 3 VIEWS 2/5/2025 6:51 pm   INDICATION: Signs/Symptoms:Left femoral  neck fracture nonunion   COMPARISON: None available.   ACCESSION NUMBER(S): [id number]   ORDERING CLINICIAN: MELISSA WILSON   TECHNIQUE: AP view of the pelvis with AP and lateral views of the left hip.   FINDINGS: Deformed prior left femoral neck fracture with callus formation. No new acute fractures. Prior right hip arthroplasty.       1. Deformed prior left femoral neck fracture with callus formation. No new acute fractures   Signed by: Jose M Lo 2/6/2025 7:21 AM Dictation workstation:   [id number]    US renal complete    Result Date: 2/5/2025  Interpreted By:  Vineet Pruitt, STUDY: US RENAL COMPLETE;  2/5/2025 1:49 pm   INDICATION: Signs/Symptoms:herman.   COMPARISON: None.   ACCESSION NUMBER(S): [id number]   ORDERING CLINICIAN: ISHA FONSECA   TECHNIQUE: Multiple images of the kidneys were obtained  , with color Doppler for blood flow.  It is noted the exam was limited due to patient habitus , limited mobility and bowel gas.   FINDINGS: RIGHT KIDNEY: The right kidney measures 13.8 cm in length.  The renal cortex is within normal limits.   No hydronephrosis or evidence of nephrolithiasis. Color Doppler demonstrates renal blood flow.   LEFT KIDNEY: The left kidney measures 12.6 cm in length. The renal cortex is within normal limits.  An exophytic 5.3 x 4.7 cm cyst is present at the midpole. No hydronephrosis or evidence of nephrolithiasis.. Color Doppler demonstrates renal blood flow.   BLADDER: Decompressed with a Caldwell catheter and not well visualized or evaluated.   OTHER FINDINGS: None significant.       Unremarkable renal ultrasound.   Signed by: Vineet Pruitt 2/5/2025 5:01 PM Dictation workstation:   [id number]    CT abdomen pelvis wo IV contrast    Result Date: 2/5/2025  Interpreted By:  Mario Menchaca, STUDY: CT ABDOMEN PELVIS WO IV CONTRAST; 2/5/2025 2:10 pm   INDICATION: Signs/Symptoms:abdominal pain; decreased appetite.   COMPARISON: None available at this time. There is a previous CT abdomen and  pelvis report dated 01/12/2025 which is available. The actual images are not available.   ACCESSION NUMBER(S): [id number]   ORDERING CLINICIAN: ISHA FONSECA   TECHNIQUE: COMMENTS: This exam is performed without oral or intravenous contrast as was requested. Please note that the absence of oral and intravenous contrast material does limit the sensitivity and specificity of this examination. In particular solid organ assessment for neoplasm is significantly limited. Assessment of the GI tract is also limited without the aid of oral contrast administration. Vascular abnormalities such as dissection, occlusions, infarcts and thrombus may also go undetected.   One or more of the following dose reduction techniques were used: Automated exposure control Adjustment of the mA and/or kV according to patient size, and/or use of iterative reconstruction technique.   FINDINGS: COMMENTS: Exam is somewhat limited by motion artifact.   The visualized portions of the pulmonary bases are clear.  Bilateral gynecomastia is present. Within limits of this unenhanced study no focal masses are identified within the liver, spleen, pancreas, or adrenal glands.  The biliary tree is unremarkable.   Urinary Tract Assessment:   RT KIDNEY: No right sided urinary tract stones are identified. Within the limits of this unenhanced exam no solid renal masses are identified.   LT KIDNEY: No left sided urinary tract stones are identified.  There is a 4.1 cm cyst midpole left kidney laterally. Within the limits of this unenhanced exam no solid renal masses are identified.   There is an IVC filter. There is no evidence for abdominal aortic aneurysm. There is calcific plaque within the abdominal aorta. No retroperitoneal lymphadenopathy is identified.   There is a subacute/chronic appearing femoral neck fracture which has not been treated. There is impaction at the fracture site with varus deformity. A subacute/chronic left femoral neck fracture was  described on the outside report. Please correlate with clinical history as to whether this requires orthopedic intervention for fixation with a left hip prosthesis. Patient is status post right hip prosthesis placement along with a femoral screw and plate fixation device laterally.   There are wedge fractures of T7, T11, L1, and L2. The L2 fracture appears to be acute/subacute. The T12 fracture appears acute/subacute.   Bowel loops are unremarkable in appearance although partially obscured by motion artifact.   PELVIC CT: No pathologic masses or fluid collections are identified. Caldwell catheter is present within the urinary bladder.       1. Left femoral neck fracture likely subacute or chronic. Orthopedic consultation is recommended for assessment of appropriate treatment. 2. Multiple vertebral body compression fractures as above with the L2 and T12 fracture is thought to be acute/subacute. 3. Left renal cyst. 4. Limited exam due to motion.   MACRO: none   Signed by: Mario Menchaca 2/5/2025 3:21 PM Dictation workstation:   [id number]    XR chest 1 view    Result Date: 2/5/2025  Interpreted By:  Schoenberger, Joseph, STUDY: XR CHEST 1 VIEW;  2/5/2025 10:28 am   INDICATION: Signs/Symptoms:weak.     COMPARISON: None.   ACCESSION NUMBER(S): [id number]   ORDERING CLINICIAN: KRISTEN BAEZ   FINDINGS:         CARDIOMEDIASTINAL SILHOUETTE: Cardiomediastinal silhouette is normal in size and configuration.   LUNGS: Lungs are clear.   ABDOMEN: No remarkable upper abdominal findings.   BONES: No acute osseous changes.       1.  No evidence of acute cardiopulmonary process.       MACRO: None   Signed by: Joseph Schoenberger 2/5/2025 10:30 AM Dictation workstation:   [id number]    CT head wo IV contrast    Result Date: 1/12/2025  EXAMINATION: CT HEAD W/O CONTRAST 01/12/2025 01:42 PM CLINICAL HISTORY: Trauma; fall ASSOCIATED DIAGNOSIS: Trauma fall ORDERING PROVIDER: RICHELLE WHITTINGTON TECHNRUCHI NOTE: COMPARISON: CT HEAD W/O  CONTRAST 1/4/2025, 10:57 AM TECHNIQUE: Thin axial imaging of the head was performed without intravenous contrast. FINDINGS: No mass or acute hemorrhage. No evidence of acute infarct. Moderate generalized brain parenchymal volume loss with commensurate ventricular caliber. Patchy and confluent foci of white matter hypoattenuation, most likely moderate chronic microvascular angiopathy. There are atherosclerotic calcifications of the carotid siphons. Mild paranasal sinus mucosal thickening. There are a couple of mucous retention cysts within the left maxillary sinus. No acute osseous abnormalities.   IMPRESSION: No acute intracranial abnormality. MACRO: None I have personally reviewed the images and agree with the resident's interpretation.    CT cervical spine wo IV contrast    Result Date: 1/12/2025  EXAMINATION: CT C-SPINE W/O CONTRAST 01/12/2025 01:42 PM CLINICAL HISTORY: Trauma; fall ASSOCIATED DIAGNOSIS: Trauma fall ORDERING PROVIDER: RICHELLE WHITTINGTON TECHNRUCHI NOTE: COMPARISON: None TECHNIQUE: Thin isotropic axial images were obtained from the skull base to the upper thoracic spine without intravenous contrast. 2D sagittal and coronal reconstructions were obtained from the axial data. FINDINGS: Vertebrae: No acute fracture or traumatic malalignment. No aggressive osseous lesions. Mild multilevel spondylosis causing at least mild canal narrowing, greatest at C6-C7. Soft Tissues: No acute abnormality. IMPRESSION: No acute cervical spine fracture or traumatic malalignment. MACRO: None I have personally reviewed the images and agree with the resident's interpretation.    CT T-SPINE/L-SPINE W/O CONTRAST    Result Date: 1/12/2025  EXAMINATION: CT T-SPINE/L-SPINE W/O CONTRASTPRO/PRO   01/12/2025 01:42 PM CLINICAL HISTORY: Trauma; fall ASSOCIATED DIAGNOSIS: Trauma fall ORDERING PROVIDER: RICHELLE WHITTINGTON TECHNRUCHI NOTE: COMPARISON: CT of the chest abdomen and pelvis the same day. CT T-SPINE/L-SPINE W/O CONTRAST 8/11/2024,  3:31 PM TECHNIQUE: Thin axial images were obtained through the thoracic and lumbar spine without intravenous contrast. 2D sagittal and coronal reconstructions were obtained from the axial data. FINDINGS: Severe osteopenia is present. There is one third loss of height of L2 vertebral body with a prominent Schmorl's node at the superior endplate. This is late subacute to chronic and there is interval loss of height as compared to prior MR of 8/14/2024. There is chronic loss of height of L1 which is grossly unchanged from the prior MRI. Anterior wedging and loss of height of T11 vertebral body is again seen and is grossly unchanged. Multilevel degenerative changes are present in the thoracolumbar spine. Mild loss of height of T7 and T8 vertebral bodies appear chronic. The pedicles and facets are intact. Visible sacrum and pelvis: No acute abnormality. IMPRESSION: Prominent osteopenia. There is interval progression of loss of height of L2 vertebral body. This is late subacute to chronic. Loss of height of additional thoracolumbar vertebral bodies as described above. MACRO: None I have personally reviewed the images and agree with the resident's interpretation.    CT chest abdomen pelvis w IV contrast    Result Date: 1/12/2025  EXAMINATION: CT CHEST/ABD/PELVIS W/ CONTRAST 01/12/2025 01:42 PM CLINICAL HISTORY: Trauma; fall ASSOCIATED DIAGNOSIS: Trauma fall ORDERING PROVIDER: RICHELLE WHITTINGTON TECHNOLOGISTS NOTE: COMPARISON: CT CHEST/ABD/PELVIS W/ 9/27/2018, 6:03 PM TECHNIQUE: Contiguous axial images were obtained through the chest abdomen and pelvis from the level of the thoracic inlet through the pubic symphysis following administration of  intravenous contrast. MPR sagittal and coronal reconstructions were obtained from the axial data. Before infusion of intravenous contrast, radiology personnel investigated the possibility of an allergic history and of any history of reaction to iodinated contrast material. Contrast Protocol:  Omnipaque 350 [>or =100lb] 100 ml [<100 lb] 1 ml per 1 lb. INTRA-PROCEDURE MEDS: iohexol (OMNIPAQUE) 350 MG/ML injection 100 mL Route: Intravenous Push FINDINGS: Cardiovasculature: The heart is normal in size. Atherosclerotic calcifications are present within the coronary arteries. Mediastinum/Pericardium: Unremarkable Pleura: Unremarkable Central Airways: Widely patent Lungs: No focal consolidation. Nodules: There are a few stable scattered subcentimeter pulmonary nodules not requiring dedicated imaging follow-up. Lymph Nodes: No thoracic lymphadenopathy is evident. Hepatobiliary: There is a stable subcentimeter low-density lesion in the dome, likely cyst or hemangioma. No biliary dilation. Pancreas: Unremarkable Spleen: Unremarkable Adrenal Glands: Unremarkable Kidneys, ureters, and bladder: No calculi or hydroureteronephrosis. Multiple Bosniak I and/or Bosniak II renal cysts which do not meet imaging criteria for follow up. Small amount of gas in the urinary bladder. Abdominal and pelvic vasculature: Atherosclerotic wall calcifications are present without abdominal aortic aneurysm. Inferior vena cava filter. GI tract: No evidence of obstruction. The appendix is within normal limits. Colonic diverticulosis is present without evidence for diverticulitis. Peritoneum and retroperitoneum: No free fluid or free air. Lymph Nodes: No lymphadenopathy. Prostate and seminal vesicles: Unremarkable Visualized musculoskeletal structures: Subacute-chronic appearing fracture of left femoral neck. Postsurgical changes and hardware of right proximal femur/arthroplasty, appearing intact. Gynecomastia. Fracture deformities of the spine. Degenerative changes of the spine and shoulders. IMPRESSION: 1.  Subacute to chronic appearing fracture of left femoral neck. Please correlate with history and any acute pain in the region. 2.  Fracture deformities of the spine. Please see the separate interpretation of same-day CT spine exams for  acuity descriptions. 3.  No other potentially acute traumatic injury is identified involving chest abdomen and pelvis. 4.  Nonspecific gas in the urinary bladder lumen. Please correlate with any recent instrumentation. 5.  Please see descriptions of other ancillary findings above. MACRO: None       Assessment/Plan   Acute kidney injury secondary to obstructive uropathy  Hyponatremia secondary to SIADH  Simple renal cyst  Thrombocytopenia  Diabetes  Plan continue with; full resuscitation  Continue to hold potential nephrotoxins  Urinary indices/urine eosinophils/serum myoglobin  Thank you very much for the consult  Assessment & Plan  CELESTE (acute kidney injury) (CMS-[facility])      I spent 60 minutes in the professional and overall care of this patient.      Cristobal Bautista MD

## 2025-02-06 NOTE — NURSING NOTE
"Patient removed Ultrasound guided IV. This nurse called code white nurse who placed IV earlier in shift and was asked to please page the procedural resident. This nurse spoke to \"the resident with pager\" and informed that resident an ultrasound guided IV was needed on this patient. Awaiting placement at this time.  "

## 2025-02-06 NOTE — PROGRESS NOTES
Occupational Therapy    Evaluation    Patient Name: Vineet Lira  MRN: [id number]  Today's Date: 2/6/2025  Time Calculation  Start Time: 0938  Stop Time: 0956  Time Calculation (min): 18 min    Assessment  IP OT Assessment  OT Assessment: Patient requires extensive assist for all care secondary to impaired cognition, impaired activity tolerance and impaired trunk control.  Patient would benefit from 24 hour care with O.T. services at a moderate intensity to improve independence with ADLs, transfers & mobility.  Prognosis: Good  End of Session Communication: Bedside nurse  End of Session Patient Position: Bed, 3 rail up, Alarm on (call light in reach)    Plan:  Treatment Interventions: ADL retraining, Functional transfer training, UE strengthening/ROM, Endurance training, Neuromuscular reeducation, Compensatory technique education  OT Frequency: 3 times per week  OT Discharge Recommendations: Moderate intensity level of continued care, 24 hr supervision due to cognition  OT - OK to Discharge: Yes (from an O.T. standpoint)    Subjective     Current Problem:  Patient Active Problem List   Diagnosis    Benign essential tremor    Essential hypertension, benign    Partial symptomatic epilepsy with complex partial seizures, not intractable, without status epilepticus (Multi)    Seizure disorder (Multi)    Weakness    Palpitations    CELESTE (acute kidney injury) (CMS-[facility])       General:  General  Reason for Referral: OT eval & treat for ADLs/safety (CELESTE, urinary retention, tachycardia)  Referred By: Liang Medina PA-C  Past Medical History Relevant to Rehab: 2/5/25: admitted from SNF due to worsening kidney function on lab work, nausea, abd pain.  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  PMH: obesity, seizures, frequent falls, BPH, GERD, B12 deficiency, depression,  hypothyroidism, HLD, HTN, DM  Co-Treatment: PT  Co-Treatment Reason: To maximize patient safety & mobility  Prior to Session Communication: Bedside nurse  Patient Position Received: Bed, 4 rail up  General Comment: Patient cleared for therapy by nursing.    Precautions:  Precautions Comment: Ambulate with assist, fall/safety, bed alarm, gonzales catheter,       Pain:  Pain Assessment  Pain Assessment: 0-10  0-10 (Numeric) Pain Score: 8  Pain Type: Chronic pain  Pain Location:  (left leg/hip)  Pain Interventions: Repositioned    Objective     Cognition:  Overall Cognitive Status: Impaired (Oriented to self only; poor historian, confused, distractible, follows one step commands with mod cues)        Home Living:  Home Living Comments: Patient unable to provide baseline functional status secondary to impaired cognition; Per EMR, patient admitted from [facility], was getting therapy, walking with walker.        ADL:  Grooming Assistance: Maximal  UE Dressing Assistance: Maximal  LE Dressing Assistance: Total  Toileting Assistance with Device: Total    Activity Tolerance:  Endurance: Decreased tolerance for upright activites (Reports dizziness in sitting)    Bed Mobility/Transfers:   Bed Mobility  Bed Mobility:  (max assist x 2 supine <-> sit)  Transfers  Transfer:  (unable/unsafe to trial secondary to impaired activity tolerance, dizziness, impaired trunk control and impaired cognition.)    Sitting Balance:  Static Sitting Balance  Static Sitting-Balance Support: Bilateral upper extremity supported, Feet supported  Static Sitting-Level of Assistance: Contact guard    Sensation:  Light Touch: No apparent deficits    Strength:  Strength Comments: BUE grossly 3-/5 proximally, 4-/5 distally      Outcome Measures: [facility] Daily Activity  Putting on and taking off regular lower body clothing: Total  Bathing (including washing, rinsing, drying): A lot  Putting on and taking off regular upper body clothing: A lot  Toileting, which includes  using toilet, bedpan or urinal: Total  Taking care of personal grooming such as brushing teeth: A lot  Eating Meals: A little  Daily Activity - Total Score: 11           EDUCATION:     Education Documentation  ADL Training, taught by June Gallardo OT at 2/6/2025 11:41 AM.  Learner: Patient  Readiness: Acceptance  Method: Demonstration, Explanation  Response: Needs Reinforcement    Education Comments  No comments found.        Goals:   Encounter Problems       Encounter Problems (Active)       OT Goals       Tolerate 15 minutes UE therex with rest periods prn to promote increased activity tolerance for ADLs  (Progressing)       Start:  02/06/25    Expected End:  02/20/25            Increase bed mobility to min assist and functional transfers to/from bed, chair & commode to mod assist with DME for safety  (Progressing)       Start:  02/06/25    Expected End:  02/20/25            Increase grooming & UE bathing/dressing to SBA (Progressing)       Start:  02/06/25    Expected End:  02/20/25            Increase static & dynamic sit balance to SBA with UE support to promote increased safety with ADLs (Progressing)       Start:  02/06/25    Expected End:  02/20/25

## 2025-02-06 NOTE — CARE PLAN
Problem: Pain - Adult  Goal: Verbalizes/displays adequate comfort level or baseline comfort level  2/6/2025 1157 by Louise Omer RN  Outcome: Progressing  2/6/2025 1157 by Louise Omer RN  Outcome: Progressing     Problem: Safety - Adult  Goal: Free from fall injury  2/6/2025 1157 by Louise Omer RN  Outcome: Progressing  2/6/2025 1157 by Louise Omer RN  Outcome: Progressing     Problem: Discharge Planning  Goal: Discharge to home or other facility with appropriate resources  2/6/2025 1157 by Louise Omer RN  Outcome: Progressing  2/6/2025 1157 by Louise Omer RN  Outcome: Progressing     Problem: Chronic Conditions and Co-morbidities  Goal: Patient's chronic conditions and co-morbidity symptoms are monitored and maintained or improved  2/6/2025 1157 by Louise Omer RN  Outcome: Progressing  2/6/2025 1157 by Louise Omer RN  Outcome: Progressing     Problem: Nutrition  Goal: Nutrient intake appropriate for maintaining nutritional needs  2/6/2025 1157 by Louise Omer RN  Outcome: Progressing  2/6/2025 1157 by Louise Omer RN  Outcome: Progressing     Problem: Skin  Goal: Decreased wound size/increased tissue granulation at next dressing change  2/6/2025 1157 by Louise Omer RN  Outcome: Progressing  2/6/2025 1157 by Louise Omer RN  Outcome: Progressing  Flowsheets (Taken 2/6/2025 1157)  Decreased wound size/increased tissue granulation at next dressing change: Promote sleep for wound healing  Goal: Participates in plan/prevention/treatment measures  2/6/2025 1157 by Louise Omer RN  Outcome: Progressing  2/6/2025 1157 by Louise Omer RN  Outcome: Progressing  Flowsheets (Taken 2/6/2025 1157)  Participates in plan/prevention/treatment measures: Elevate heels  Goal: Prevent/manage excess moisture  2/6/2025 1157 by Louise Omer RN  Outcome: Progressing  2/6/2025 1157  by Louise Omer RN  Outcome: Progressing  Flowsheets (Taken 2/6/2025 1157)  Prevent/manage excess moisture: Cleanse incontinence/protect with barrier cream  Goal: Prevent/minimize sheer/friction injuries  2/6/2025 1157 by Louise Omer RN  Outcome: Progressing  2/6/2025 1157 by Louise Omer RN  Outcome: Progressing  Flowsheets (Taken 2/6/2025 1157)  Prevent/minimize sheer/friction injuries: Increase activity/out of bed for meals  Goal: Promote/optimize nutrition  2/6/2025 1157 by Louise Omer RN  Outcome: Progressing  2/6/2025 1157 by Louise Omer RN  Outcome: Progressing  Flowsheets (Taken 2/6/2025 1157)  Promote/optimize nutrition: Monitor/record intake including meals  Goal: Promote skin healing  2/6/2025 1157 by Louise Omer RN  Outcome: Progressing  2/6/2025 1157 by Louise Omer RN  Outcome: Progressing  Flowsheets (Taken 2/6/2025 1157)  Promote skin healing: Turn/reposition every 2 hours/use positioning/transfer devices     Problem: Pain  Goal: Takes deep breaths with improved pain control throughout the shift  2/6/2025 1157 by Louise Omer, KORI  Outcome: Progressing  2/6/2025 1157 by Louise Omer RN  Outcome: Progressing  Goal: Turns in bed with improved pain control throughout the shift  2/6/2025 1157 by Louise Omer RN  Outcome: Progressing  2/6/2025 1157 by Louise Omer RN  Outcome: Progressing  Goal: Walks with improved pain control throughout the shift  2/6/2025 1157 by Louise Omer RN  Outcome: Progressing  2/6/2025 1157 by Louise Omer RN  Outcome: Progressing  Goal: Performs ADL's with improved pain control throughout shift  2/6/2025 1157 by Louise Omer RN  Outcome: Progressing  2/6/2025 1157 by Louise Radha Skidd, RN  Outcome: Progressing  Goal: Participates in PT with improved pain control throughout the shift  2/6/2025 1157 by Louise Omer,  RN  Outcome: Progressing  2/6/2025 1157 by Louise Omer RN  Outcome: Progressing  Goal: Free from opioid side effects throughout the shift  2/6/2025 1157 by Louise Omer RN  Outcome: Progressing  2/6/2025 1157 by Louise Omer RN  Outcome: Progressing  Goal: Free from acute confusion related to pain meds throughout the shift  2/6/2025 1157 by Louise Omer RN  Outcome: Progressing  2/6/2025 1157 by Louise Omer RN  Outcome: Progressing   The patient's goals for the shift include      The clinical goals for the shift include Patient will remain hemodynamically stable throughout shift.

## 2025-02-06 NOTE — PROGRESS NOTES
Vineet Lira is a 69 y.o. male on day 1 of admission presenting with CELESTE (acute kidney injury) (CMS-[facility]).      Subjective   Patient had a Caldwell placed. Patient seen and examined at bedside. He does not like having a Caldwell but otherwise has no complaints.        Objective     Last Recorded Vitals  /58   Pulse 97   Temp 36.3 °C (97.3 °F)   Resp 20   Wt 113 kg (249 lb)   SpO2 94%   Intake/Output last 3 Shifts:    Intake/Output Summary (Last 24 hours) at 2/6/2025 1242  Last data filed at 2/6/2025 0600  Gross per 24 hour   Intake 50 ml   Output 800 ml   Net -750 ml       Admission Weight  Weight: 113 kg (249 lb) (02/05/25 0948)    Daily Weight  02/05/25 : 113 kg (249 lb)    Image Results  ECG 12 Lead  Sinus tachycardia  Probable left atrial enlargement  Left anterior fascicular block  LVH with secondary repolarization abnormality  Borderline ST elevation, lateral leads  XR hip left with pelvis when performed 2 or 3 views  Narrative: Interpreted By:  Jose M Lo,   STUDY:  XR HIP LEFT WITH PELVIS WHEN PERFORMED 2 OR 3 VIEWS 2/5/2025 6:51 pm      INDICATION:  Signs/Symptoms:Left femoral neck fracture nonunion      COMPARISON:  None available.      ACCESSION NUMBER(S):  [id number]      ORDERING CLINICIAN:  MELISSA WILSON      TECHNIQUE:  AP view of the pelvis with AP and lateral views of the left hip.      FINDINGS:  Deformed prior left femoral neck fracture with callus formation. No  new acute fractures. Prior right hip arthroplasty.      Impression: 1. Deformed prior left femoral neck fracture with callus formation.  No new acute fractures      Signed by: Jose M Lo 2/6/2025 7:21 AM  Dictation workstation:   [id number]      Physical Exam  Constitutional:       General: He is not in acute distress.     Appearance: He is obese.   HENT:      Head: Normocephalic and atraumatic.      Mouth/Throat:      Mouth: Mucous membranes are dry.      Pharynx: Oropharynx is clear.   Eyes:      Extraocular Movements:  Extraocular movements intact.      Conjunctiva/sclera: Conjunctivae normal.      Pupils: Pupils are equal, round, and reactive to light.   Cardiovascular:      Rate and Rhythm: Regular rhythm. Tachycardia present.      Pulses: Normal pulses.      Heart sounds: Normal heart sounds.   Pulmonary:      Effort: Pulmonary effort is normal. No respiratory distress.      Breath sounds: Normal breath sounds. No wheezing.   Abdominal:      General: Bowel sounds are normal.      Palpations: Abdomen is soft.      Tenderness: There is abdominal tenderness (Generalized abdominal tenderness with palpation).   Genitourinary:     Comments: Caldwell catheter in place, good urine output, dark in color.  Musculoskeletal:      Right lower leg: Edema present.      Left lower leg: Edema present.   Skin:     General: Skin is warm and dry.   Neurological:      Mental Status: He is alert. Mental status is at baseline.      Motor: Weakness present.   Psychiatric:         Mood and Affect: Mood normal.         Behavior: Behavior normal.      Relevant Results               Assessment/Plan      Vineet Lira is a 69 y.o. male presents from nursing facility for worsening kidney function on lab work yesterday.  History somewhat limited from patient.  Does admit to occasional mild nausea, generalized abdominal pain, and decreased appetite/oral intake over the past 3-4 days.  Does report he has not been peeing as much over the past week or so.  States this has happened in the past, however is unable to tell me any more information about it. Denies any pain with urination or bleeding.  Does report he has been feeling weaker, and of note, patient seen multiple times in the past month at [facility] for evaluation after falls. Patient currently denying any symptoms in the ED aside from mild abdominal discomfort.  States he feels like he has not had an appetite recently.      CODE STATUS: Full code     #Acute kidney injury  #Acute urinary retention  #Mild  hyponatremia, 132  #Hypochloremia, 90  #Tachycardia  #Abdominal pain, nausea, decreased appetite  Admit as inpatient with telemetry monitoring  Urinalysis shows yellow and turbid appearance with 1+ protein, trace glucose, 1+ blood, negative nitrites, negative leukocyte esterase, 11-20 WBCs, 1+ bacteria, 2+ hyaline cast, 2+ granular casts  Nephrology consult  Urology consult  -BUN 90/creatinine 6.36 today, BUN 80/creatinine 6.31 on 2/4/25 (BUN 19/creatinine 0.63 on 1/12/2025)  Blood culture/urine culture pending  Continuous IV fluids, given 500 mL NS bolus in the ED  Hold off on nephrotoxic medications  Bilateral renal ultrasound ordered  CT abdomen/pelvis without contrast ordered  Urine osmolality/urine sodium ordered, CK added onto lab work  Tylenol, Zofran as needed  N.p.o. until CT abdomen/pelvis results  Q 4 vitals  CBC, BMP in the a.m.  Will start on IV Rocephin empirically until urine culture results  -Per home medication review, patient takes 100 mg losartan daily and 20 mg Lipitor daily, will hold.  Consider cardiology consult for tachycardia pending clinical course, will defer to attending.     -1605, notified by RN that CT abdomen/pelvis resulted.  Shows left femoral neck fracture likely subacute or chronic, orthopedic consultation recommended.  She has multiple vertebral body compression fractures with the L2 and T12 fracture, thought to be acute/subacute, left renal cyst.  Similar findings on CT abdomen/pelvis from 1/12 after patient was evaluated after a fall, however per my EMR review does not appear Ortho was consulted at that time.  Will consult orthopedic surgery team for further evaluation.     #Diabetes mellitus  Sliding scale insulin  ACHS Accu-Cheks     #Thrombocytopenia  Platelets 144 today, platelets 184 on 1/12  Monitor with a.m. labs     #Generalized weakness/history of frequent falls  PT/OT for evaluation and treatment  Bed alarm, fall precautions     Continue home medications when med rec  is completed     Chronic conditions:  Obesity, history of seizures, frequent falls, BPH, GERD, depression, hypothyroidism, vitamin B12 deficiency, DM, HLD, HTN     #DVT prophylaxis  SCDs, ambulation as tolerated  Heparin     2/6: Caldwell placed for urinary retention. Started on Flomax per Urology. On IVF. Creatinine improved from 6.3 marianne nto 4.1. Patient with 1.25L urine output over 24 hours. Decrease amlodipine and increase metoprolol given soft BP and tachycardia.    This patient has a urinary catheter   Reason for the urinary catheter remaining today? urinary retention/bladder outlet obstruction, acute or chronic         Kyle Jackson, DO

## 2025-02-06 NOTE — PROGRESS NOTES
02/06/25 1037   Discharge Planning   Living Arrangements Other (Comment)   Support Systems None   Expected Discharge Disposition Inter   Does the patient need discharge transport arranged? Yes     I met with patient at his bedside but he was unable to contribute meaningfully to discussions.  Patient presented from [facility], does not know how long he had been there.  He did report that he has fallen down steps and slipped on ice.  His  is Humphrey Dewey but patient was unable to tell me who Humphrey is or how he knows him, reported he is caregiver to Humphrey; the # we have for Humphrey is not in service.  Paperwork from facility list patient's brother Mario whom I attempted reach, left [facility] requesting return call.  I called, spoke with a nurse at [facility] but she is new there, unable to tell me more than he has resided there since August 2024 and was ambulating with a walker.  [facility] tasked with sending referral for patient's return to facility.  Care Coordination team following for assistance with discharge planning.  Tana SHEIKH TCC

## 2025-02-07 LAB
ANION GAP SERPL CALC-SCNC: 15 MMOL/L (ref 10–20)
BACTERIA FOR EOSINOPHIL SMEAR: ABNORMAL
BUN SERPL-MCNC: 84 MG/DL (ref 6–23)
CALCIUM SERPL-MCNC: 9.1 MG/DL (ref 8.6–10.3)
CHLORIDE SERPL-SCNC: 94 MMOL/L (ref 98–107)
CHLORIDE UR-SCNC: <15 MMOL/L
CHLORIDE/CREATININE (MMOL/G) IN URINE: NORMAL
CO2 SERPL-SCNC: 25 MMOL/L (ref 21–32)
CREAT SERPL-MCNC: 1.93 MG/DL (ref 0.5–1.3)
CREAT UR-MCNC: 135.4 MG/DL (ref 20–370)
EGFRCR SERPLBLD CKD-EPI 2021: 37 ML/MIN/1.73M*2
EOSINOPHIL SMEAR: ABNORMAL
ERYTHROCYTE [DISTWIDTH] IN BLOOD BY AUTOMATED COUNT: 12.6 % (ref 11.5–14.5)
GLUCOSE BLD MANUAL STRIP-MCNC: 125 MG/DL (ref 74–99)
GLUCOSE BLD MANUAL STRIP-MCNC: 127 MG/DL (ref 74–99)
GLUCOSE BLD MANUAL STRIP-MCNC: 162 MG/DL (ref 74–99)
GLUCOSE BLD MANUAL STRIP-MCNC: 99 MG/DL (ref 74–99)
GLUCOSE SERPL-MCNC: 110 MG/DL (ref 74–99)
HCT VFR BLD AUTO: 34.1 % (ref 41–52)
HGB BLD-MCNC: 12 G/DL (ref 13.5–17.5)
MCH RBC QN AUTO: 32.3 PG (ref 26–34)
MCHC RBC AUTO-ENTMCNC: 35.2 G/DL (ref 32–36)
MCV RBC AUTO: 92 FL (ref 80–100)
MICROALBUMIN UR-MCNC: 68.3 MG/L
MICROALBUMIN/CREAT UR: 50.4 UG/MG CREAT
NEUTROPHILS FOR EOSINOPHIL SMEAR: ABNORMAL
NRBC BLD-RTO: 0 /100 WBCS (ref 0–0)
PLATELET # BLD AUTO: 150 X10*3/UL (ref 150–450)
POTASSIUM SERPL-SCNC: 4.2 MMOL/L (ref 3.5–5.3)
POTASSIUM UR-SCNC: 37 MMOL/L
POTASSIUM/CREAT UR-RTO: 27 MMOL/G CREAT
RBC # BLD AUTO: 3.71 X10*6/UL (ref 4.5–5.9)
SODIUM SERPL-SCNC: 130 MMOL/L (ref 136–145)
SODIUM UR-SCNC: <10 MMOL/L
SODIUM/CREAT UR-RTO: NORMAL
UREA/CREAT UR-SRTO: 4.8 G/G CREAT
UUN UR-MCNC: 653 MG/DL
WBC # BLD AUTO: 7.9 X10*3/UL (ref 4.4–11.3)

## 2025-02-07 PROCEDURE — 99232 SBSQ HOSP IP/OBS MODERATE 35: CPT | Performed by: REGISTERED NURSE

## 2025-02-07 PROCEDURE — 82947 ASSAY GLUCOSE BLOOD QUANT: CPT

## 2025-02-07 PROCEDURE — 2500000001 HC RX 250 WO HCPCS SELF ADMINISTERED DRUGS (ALT 637 FOR MEDICARE OP): Performed by: INTERNAL MEDICINE

## 2025-02-07 PROCEDURE — 2500000004 HC RX 250 GENERAL PHARMACY W/ HCPCS (ALT 636 FOR OP/ED): Performed by: PHYSICIAN ASSISTANT

## 2025-02-07 PROCEDURE — 2500000005 HC RX 250 GENERAL PHARMACY W/O HCPCS: Performed by: PHYSICIAN ASSISTANT

## 2025-02-07 PROCEDURE — 89190 NASAL SMEAR FOR EOSINOPHILS: CPT | Mod: PARLAB | Performed by: INTERNAL MEDICINE

## 2025-02-07 PROCEDURE — 84540 ASSAY OF URINE/UREA-N: CPT | Performed by: INTERNAL MEDICINE

## 2025-02-07 PROCEDURE — 82507 ASSAY OF CITRATE: CPT | Performed by: INTERNAL MEDICINE

## 2025-02-07 PROCEDURE — 36415 COLL VENOUS BLD VENIPUNCTURE: CPT | Performed by: INTERNAL MEDICINE

## 2025-02-07 PROCEDURE — 84153 ASSAY OF PSA TOTAL: CPT | Performed by: INTERNAL MEDICINE

## 2025-02-07 PROCEDURE — 84154 ASSAY OF PSA FREE: CPT | Performed by: INTERNAL MEDICINE

## 2025-02-07 PROCEDURE — 82436 ASSAY OF URINE CHLORIDE: CPT | Performed by: INTERNAL MEDICINE

## 2025-02-07 PROCEDURE — 85027 COMPLETE CBC AUTOMATED: CPT | Performed by: INTERNAL MEDICINE

## 2025-02-07 PROCEDURE — 2500000002 HC RX 250 W HCPCS SELF ADMINISTERED DRUGS (ALT 637 FOR MEDICARE OP, ALT 636 FOR OP/ED): Performed by: PHYSICIAN ASSISTANT

## 2025-02-07 PROCEDURE — 99232 SBSQ HOSP IP/OBS MODERATE 35: CPT | Performed by: ORTHOPAEDIC SURGERY

## 2025-02-07 PROCEDURE — 80048 BASIC METABOLIC PNL TOTAL CA: CPT | Performed by: INTERNAL MEDICINE

## 2025-02-07 PROCEDURE — 82570 ASSAY OF URINE CREATININE: CPT | Performed by: INTERNAL MEDICINE

## 2025-02-07 PROCEDURE — 2500000001 HC RX 250 WO HCPCS SELF ADMINISTERED DRUGS (ALT 637 FOR MEDICARE OP): Performed by: PHYSICIAN ASSISTANT

## 2025-02-07 PROCEDURE — 1200000002 HC GENERAL ROOM WITH TELEMETRY DAILY

## 2025-02-07 RX ORDER — LIDOCAINE HYDROCHLORIDE 20 MG/ML
1 JELLY TOPICAL 2 TIMES DAILY PRN
Status: DISCONTINUED | OUTPATIENT
Start: 2025-02-07 | End: 2025-02-08 | Stop reason: HOSPADM

## 2025-02-07 RX ORDER — CALCITRIOL 0.25 UG/1
0.25 CAPSULE ORAL 3 TIMES WEEKLY
Status: DISCONTINUED | OUTPATIENT
Start: 2025-02-07 | End: 2025-02-08 | Stop reason: HOSPADM

## 2025-02-07 RX ORDER — AMLODIPINE BESYLATE 5 MG/1
2.5 TABLET ORAL DAILY
Status: DISCONTINUED | OUTPATIENT
Start: 2025-02-08 | End: 2025-02-08 | Stop reason: HOSPADM

## 2025-02-07 RX ADMIN — POLYVINYL ALCOHOL, POVIDONE 1 DROP: 14; 6 SOLUTION/ DROPS OPHTHALMIC at 09:41

## 2025-02-07 RX ADMIN — HEPARIN SODIUM 5000 UNITS: 5000 INJECTION INTRAVENOUS; SUBCUTANEOUS at 02:17

## 2025-02-07 RX ADMIN — POLYVINYL ALCOHOL, POVIDONE 1 DROP: 14; 6 SOLUTION/ DROPS OPHTHALMIC at 21:11

## 2025-02-07 RX ADMIN — CALCITRIOL CAPSULES 0.25 MCG 0.25 MCG: 0.25 CAPSULE ORAL at 12:42

## 2025-02-07 RX ADMIN — METOPROLOL SUCCINATE 50 MG: 50 TABLET, EXTENDED RELEASE ORAL at 09:41

## 2025-02-07 RX ADMIN — ASPIRIN 81 MG CHEWABLE TABLET 81 MG: 81 TABLET CHEWABLE at 09:40

## 2025-02-07 RX ADMIN — LEVOTHYROXINE SODIUM 50 MCG: 0.05 TABLET ORAL at 06:31

## 2025-02-07 RX ADMIN — CETIRIZINE HYDROCHLORIDE 10 MG: 10 TABLET, FILM COATED ORAL at 09:41

## 2025-02-07 RX ADMIN — CEFTRIAXONE SODIUM 1 G: 1 INJECTION, SOLUTION INTRAVENOUS at 14:02

## 2025-02-07 RX ADMIN — ARIPIPRAZOLE 5 MG: 5 TABLET ORAL at 09:40

## 2025-02-07 RX ADMIN — SODIUM CHLORIDE 125 ML/HR: 9 INJECTION, SOLUTION INTRAVENOUS at 12:42

## 2025-02-07 RX ADMIN — PHENOBARBITAL 32.4 MG: 32.4 TABLET ORAL at 09:40

## 2025-02-07 RX ADMIN — HEPARIN SODIUM 5000 UNITS: 5000 INJECTION INTRAVENOUS; SUBCUTANEOUS at 17:36

## 2025-02-07 RX ADMIN — DIVALPROEX SODIUM 250 MG: 250 TABLET, DELAYED RELEASE ORAL at 09:41

## 2025-02-07 RX ADMIN — PHENOBARBITAL 64.8 MG: 32.4 TABLET ORAL at 21:14

## 2025-02-07 RX ADMIN — METOPROLOL SUCCINATE 50 MG: 50 TABLET, EXTENDED RELEASE ORAL at 21:11

## 2025-02-07 RX ADMIN — DIVALPROEX SODIUM 250 MG: 250 TABLET, DELAYED RELEASE ORAL at 21:11

## 2025-02-07 RX ADMIN — HEPARIN SODIUM 5000 UNITS: 5000 INJECTION INTRAVENOUS; SUBCUTANEOUS at 09:41

## 2025-02-07 RX ADMIN — TAMSULOSIN HYDROCHLORIDE 0.4 MG: 0.4 CAPSULE ORAL at 09:41

## 2025-02-07 RX ADMIN — AMLODIPINE BESYLATE 2.5 MG: 5 TABLET ORAL at 09:41

## 2025-02-07 ASSESSMENT — COGNITIVE AND FUNCTIONAL STATUS - GENERAL
DAILY ACTIVITIY SCORE: 12
PERSONAL GROOMING: A LOT
HELP NEEDED FOR BATHING: A LOT
MOVING FROM LYING ON BACK TO SITTING ON SIDE OF FLAT BED WITH BEDRAILS: A LOT
MOVING TO AND FROM BED TO CHAIR: A LOT
DRESSING REGULAR LOWER BODY CLOTHING: A LOT
CLIMB 3 TO 5 STEPS WITH RAILING: A LOT
DRESSING REGULAR UPPER BODY CLOTHING: A LOT
MOBILITY SCORE: 12
TURNING FROM BACK TO SIDE WHILE IN FLAT BAD: A LOT
EATING MEALS: A LOT
TOILETING: A LOT
WALKING IN HOSPITAL ROOM: A LOT
STANDING UP FROM CHAIR USING ARMS: A LOT

## 2025-02-07 ASSESSMENT — PAIN - FUNCTIONAL ASSESSMENT: PAIN_FUNCTIONAL_ASSESSMENT: 0-10

## 2025-02-07 ASSESSMENT — PAIN SCALES - GENERAL: PAINLEVEL_OUTOF10: 0 - NO PAIN

## 2025-02-07 ASSESSMENT — PAIN SCALES - WONG BAKER: WONGBAKER_NUMERICALRESPONSE: NO HURT

## 2025-02-07 NOTE — PROGRESS NOTES
"Vineet Lira is a 69 y.o. male on day 2 of admission presenting with CELESTE (acute kidney injury) (CMS-[facility]).    Subjective   The patient remains confused and is only able to answer to his name during simple questioning.  The patient was asked about the number of falls he may have had that could have caused his left hip fracture.  He states only 1 fall in the past but there is documentation of more than that.  He also states that he has not really walked in approximately 6 months but that is not verifiable through use of EMR.  His labs do show improvement in his acute kidney failure.       Objective     Physical Exam  The patient is in bed in no acute distress.  He is alert and oriented x 1.  1 attempting another logroll on the left side he complained of pain where my hand was placed at his knee to perform the exam and not at the groin.  He had no tenderness with palpation of the greater trochanter.    Last Recorded Vitals  Blood pressure 115/61, pulse 93, temperature 35.7 °C (96.3 °F), resp. rate 18, height 1.778 m (5' 10\"), weight 103 kg (227 lb 11.2 oz), SpO2 97%.  Intake/Output last 3 Shifts:  I/O last 3 completed shifts:  In: 3481.3 (33.7 mL/kg) [I.V.:3381.3 (32.7 mL/kg); IV Piggyback:100]  Out: 800 (7.7 mL/kg) [Urine:800 (0.2 mL/kg/hr)]  Weight: 103.3 kg     Relevant Results  The patient's left hip x-rays shows the chronic nonunion of his left femoral neck fracture with sclerotic borders along the fracture lines.  Scheduled medications  amLODIPine, 2.5 mg, oral, BID  ARIPiprazole, 5 mg, oral, Daily  aspirin, 81 mg, oral, Daily  [Held by provider] atorvastatin, 20 mg, oral, Nightly  cefTRIAXone, 1 g, intravenous, q24h  cetirizine, 10 mg, oral, Daily  divalproex, 250 mg, oral, BID  heparin (porcine), 5,000 Units, subcutaneous, q8h  insulin lispro, 0-10 Units, subcutaneous, TID AC  levothyroxine, 50 mcg, oral, Daily  [Held by provider] losartan, 100 mg, oral, q AM  lubricating eye drops, 1 drop, Both Eyes, " BID  metoprolol succinate XL, 50 mg, oral, BID  [Held by provider] pantoprazole, 20 mg, oral, Daily before breakfast  PHENobarbital, 32.4 mg, oral, q AM  PHENobarbital, 64.8 mg, oral, Nightly  tamsulosin, 0.4 mg, oral, q AM      Continuous medications  sodium chloride 0.9%, 125 mL/hr, Last Rate: 125 mL/hr (02/06/25 1644)      PRN medications  PRN medications: acetaminophen **OR** acetaminophen **OR** acetaminophen, dextrose, dextrose, glucagon, glucagon, lidocaine, meclizine, ondansetron **OR** ondansetron, polyethylene glycol  Results for orders placed or performed during the hospital encounter of 02/05/25 (from the past 24 hours)   POCT GLUCOSE   Result Value Ref Range    POCT Glucose 126 (H) 74 - 99 mg/dL   POCT GLUCOSE   Result Value Ref Range    POCT Glucose 122 (H) 74 - 99 mg/dL   POCT GLUCOSE   Result Value Ref Range    POCT Glucose 119 (H) 74 - 99 mg/dL   CBC   Result Value Ref Range    WBC 7.9 4.4 - 11.3 x10*3/uL    nRBC 0.0 0.0 - 0.0 /100 WBCs    RBC 3.71 (L) 4.50 - 5.90 x10*6/uL    Hemoglobin 12.0 (L) 13.5 - 17.5 g/dL    Hematocrit 34.1 (L) 41.0 - 52.0 %    MCV 92 80 - 100 fL    MCH 32.3 26.0 - 34.0 pg    MCHC 35.2 32.0 - 36.0 g/dL    RDW 12.6 11.5 - 14.5 %    Platelets 150 150 - 450 x10*3/uL   Basic Metabolic Panel   Result Value Ref Range    Glucose 110 (H) 74 - 99 mg/dL    Sodium 130 (L) 136 - 145 mmol/L    Potassium 4.2 3.5 - 5.3 mmol/L    Chloride 94 (L) 98 - 107 mmol/L    Bicarbonate 25 21 - 32 mmol/L    Anion Gap 15 10 - 20 mmol/L    Urea Nitrogen 84 (H) 6 - 23 mg/dL    Creatinine 1.93 (H) 0.50 - 1.30 mg/dL    eGFR 37 (L) >60 mL/min/1.73m*2    Calcium 9.1 8.6 - 10.3 mg/dL   POCT GLUCOSE   Result Value Ref Range    POCT Glucose 99 74 - 99 mg/dL   Albumin-Creatinine Ratio, Urine Random   Result Value Ref Range    Albumin, Urine Random 68.3 Not established mg/L    Creatinine, Urine Random 135.4 20.0 - 370.0 mg/dL    Albumin/Creatinine Ratio 50.4 (H) <30.0 ug/mg Creat   Urine electrolytes   Result  Value Ref Range    Sodium, Urine Random <10 mmol/L    Sodium/Creatinine Ratio      Potassium, Urine Random 37 mmol/L    Potassium/Creatinine Ratio 27 Not established mmol/g Creat    Chloride, Urine Random <15 mmol/L    Chloride/Creatinine Ratio      Creatinine, Urine Random 135.4 20.0 - 370.0 mg/dL   Urea Nitrogen, Urine Random   Result Value Ref Range    Urea Nitrogen, Urine Random 653 mg/dL    Creatinine, Urine Random 135.4 20.0 - 370.0 mg/dL    Urea Nitrogen/Creatinine Ratio 4.8 Not established. g/g creat                            Assessment/Plan   Assessment & Plan  CELESTE (acute kidney injury) (CMS-HCC)    The patient has a chronic left femoral neck nonunion.  At this time he is still too confused to even consider surgery besides his medical issues.  The patient even if he had surgery would be a high dislocation risk postoperatively if his mental status does not improve.       I spent 20 minutes in the professional and overall care of this patient.      Daniel Snyder MD

## 2025-02-07 NOTE — CARE PLAN
Problem: Safety - Adult  Goal: Free from fall injury  Outcome: Progressing  Flowsheets (Taken 2/7/2025 0606)  Free from fall injury:   Instruct family/caregiver on patient safety   Based on caregiver fall risk screen, instruct family/caregiver to ask for assistance with transferring infant if caregiver noted to have fall risk factors   The patient's goals for the shift include      The clinical goals for the shift include maitain patient safety    Over the shift, the patient did not make progress toward the following goals. Barriers to progression include weakness. Recommendations to address these barriers include follow poc.

## 2025-02-07 NOTE — PROGRESS NOTES
Subjective   Patient is slightly confused but alert.  Renal chemistries downtrending his BUN continue to be elevated at 84 creatinine down to 1.93 still with mild hyponatremia  Patient urine output is adequate  Urinary sodium was less than 10  Fractional excretion of sodium was 0.11%  Fractional excretion of urea was 11. 11%  Both consistent with prerenal azotemia  Mild hypotension on twice with amlodipine dose to be adjusted  Cultures of the urine in the bladder so far are negative.  He have mild hyperuricemia with a uric acid   Of 8.9  He have elevation of the PTH of 204.3 consistent with secondary hyperparathyroidism of renal origin  Renal ultrasound the kidney was unremarkable except for simple midpole renal cyst of 5.3 cm in the left kidney.  Patient had x-ray left femoral neck fracture with callus formation, and multiple fractures in thoracic and lumbar spine: Studies are similar to workup done at [facility] on CT on January 12, 2025.  Orthopedic consultant and is following the patient.       Vitals 24HR  Heart Rate:  []   Temp:  [35.7 °C (96.3 °F)-36.7 °C (98.1 °F)]   Resp:  [18]   BP: (102-115)/(56-72)   Weight:  [103 kg (227 lb 11.2 oz)]   SpO2:  [93 %-97 %]         Intake/Output last 3 Shifts:    Intake/Output Summary (Last 24 hours) at 2/7/2025 1208  Last data filed at 2/7/2025 1006  Gross per 24 hour   Intake 3631.25 ml   Output 1500 ml   Net 2131.25 ml       Physical Exam    General Appearance; pleasantly confused  HEENT; pupils react to light and accommodation  Neck; no JVD no bruit no thyromegaly no cervical adenopathy  Lungs; clear to auscultation and percussion  Heart; regular rate no gallops or rubs mild tachycardia  Abdomen; active peristalsis no rebound guarding organomegaly or shifting dullness mild suprapubic tenderness  Extremities no edema  Neurological; decreased muscle tone no tremors or clonus  Relevant Results  Results for orders placed or performed during the hospital  encounter of 02/05/25 (from the past 24 hours)   POCT GLUCOSE   Result Value Ref Range    POCT Glucose 122 (H) 74 - 99 mg/dL   POCT GLUCOSE   Result Value Ref Range    POCT Glucose 119 (H) 74 - 99 mg/dL   CBC   Result Value Ref Range    WBC 7.9 4.4 - 11.3 x10*3/uL    nRBC 0.0 0.0 - 0.0 /100 WBCs    RBC 3.71 (L) 4.50 - 5.90 x10*6/uL    Hemoglobin 12.0 (L) 13.5 - 17.5 g/dL    Hematocrit 34.1 (L) 41.0 - 52.0 %    MCV 92 80 - 100 fL    MCH 32.3 26.0 - 34.0 pg    MCHC 35.2 32.0 - 36.0 g/dL    RDW 12.6 11.5 - 14.5 %    Platelets 150 150 - 450 x10*3/uL   Basic Metabolic Panel   Result Value Ref Range    Glucose 110 (H) 74 - 99 mg/dL    Sodium 130 (L) 136 - 145 mmol/L    Potassium 4.2 3.5 - 5.3 mmol/L    Chloride 94 (L) 98 - 107 mmol/L    Bicarbonate 25 21 - 32 mmol/L    Anion Gap 15 10 - 20 mmol/L    Urea Nitrogen 84 (H) 6 - 23 mg/dL    Creatinine 1.93 (H) 0.50 - 1.30 mg/dL    eGFR 37 (L) >60 mL/min/1.73m*2    Calcium 9.1 8.6 - 10.3 mg/dL   POCT GLUCOSE   Result Value Ref Range    POCT Glucose 99 74 - 99 mg/dL   Albumin-Creatinine Ratio, Urine Random   Result Value Ref Range    Albumin, Urine Random 68.3 Not established mg/L    Creatinine, Urine Random 135.4 20.0 - 370.0 mg/dL    Albumin/Creatinine Ratio 50.4 (H) <30.0 ug/mg Creat   Urine electrolytes   Result Value Ref Range    Sodium, Urine Random <10 mmol/L    Sodium/Creatinine Ratio      Potassium, Urine Random 37 mmol/L    Potassium/Creatinine Ratio 27 Not established mmol/g Creat    Chloride, Urine Random <15 mmol/L    Chloride/Creatinine Ratio      Creatinine, Urine Random 135.4 20.0 - 370.0 mg/dL   Urea Nitrogen, Urine Random   Result Value Ref Range    Urea Nitrogen, Urine Random 653 mg/dL    Creatinine, Urine Random 135.4 20.0 - 370.0 mg/dL    Urea Nitrogen/Creatinine Ratio 4.8 Not established. g/g creat   POCT GLUCOSE   Result Value Ref Range    POCT Glucose 127 (H) 74 - 99 mg/dL     ECG 12 Lead    Result Date: 2/6/2025  Sinus tachycardia Probable left atrial  enlargement Left anterior fascicular block LVH with secondary repolarization abnormality Borderline ST elevation, lateral leads    XR hip left with pelvis when performed 2 or 3 views    Result Date: 2/6/2025  Interpreted By:  Jose M Lo, STUDY: XR HIP LEFT WITH PELVIS WHEN PERFORMED 2 OR 3 VIEWS 2/5/2025 6:51 pm   INDICATION: Signs/Symptoms:Left femoral neck fracture nonunion   COMPARISON: None available.   ACCESSION NUMBER(S): [id number]   ORDERING CLINICIAN: MELISSA WILSON   TECHNIQUE: AP view of the pelvis with AP and lateral views of the left hip.   FINDINGS: Deformed prior left femoral neck fracture with callus formation. No new acute fractures. Prior right hip arthroplasty.       1. Deformed prior left femoral neck fracture with callus formation. No new acute fractures   Signed by: Jose M Lo 2/6/2025 7:21 AM Dictation workstation:   [id number]    US renal complete    Result Date: 2/5/2025  Interpreted By:  Vineet Pruitt, STUDY: US RENAL COMPLETE;  2/5/2025 1:49 pm   INDICATION: Signs/Symptoms:herman.   COMPARISON: None.   ACCESSION NUMBER(S): [id number]   ORDERING CLINICIAN: ISHA FONSECA   TECHNIQUE: Multiple images of the kidneys were obtained  , with color Doppler for blood flow.  It is noted the exam was limited due to patient habitus , limited mobility and bowel gas.   FINDINGS: RIGHT KIDNEY: The right kidney measures 13.8 cm in length.  The renal cortex is within normal limits.   No hydronephrosis or evidence of nephrolithiasis. Color Doppler demonstrates renal blood flow.   LEFT KIDNEY: The left kidney measures 12.6 cm in length. The renal cortex is within normal limits.  An exophytic 5.3 x 4.7 cm cyst is present at the midpole. No hydronephrosis or evidence of nephrolithiasis.. Color Doppler demonstrates renal blood flow.   BLADDER: Decompressed with a Caldwell catheter and not well visualized or evaluated.   OTHER FINDINGS: None significant.       Unremarkable renal ultrasound.   Signed by: Vineet  Sonal 2/5/2025 5:01 PM Dictation workstation:   [id number]    CT abdomen pelvis wo IV contrast    Result Date: 2/5/2025  Interpreted By:  Mario Menchaca, STUDY: CT ABDOMEN PELVIS WO IV CONTRAST; 2/5/2025 2:10 pm   INDICATION: Signs/Symptoms:abdominal pain; decreased appetite.   COMPARISON: None available at this time. There is a previous CT abdomen and pelvis report dated 01/12/2025 which is available. The actual images are not available.   ACCESSION NUMBER(S): [id number]   ORDERING CLINICIAN: ISHA FONSECA   TECHNIQUE: COMMENTS: This exam is performed without oral or intravenous contrast as was requested. Please note that the absence of oral and intravenous contrast material does limit the sensitivity and specificity of this examination. In particular solid organ assessment for neoplasm is significantly limited. Assessment of the GI tract is also limited without the aid of oral contrast administration. Vascular abnormalities such as dissection, occlusions, infarcts and thrombus may also go undetected.   One or more of the following dose reduction techniques were used: Automated exposure control Adjustment of the mA and/or kV according to patient size, and/or use of iterative reconstruction technique.   FINDINGS: COMMENTS: Exam is somewhat limited by motion artifact.   The visualized portions of the pulmonary bases are clear.  Bilateral gynecomastia is present. Within limits of this unenhanced study no focal masses are identified within the liver, spleen, pancreas, or adrenal glands.  The biliary tree is unremarkable.   Urinary Tract Assessment:   RT KIDNEY: No right sided urinary tract stones are identified. Within the limits of this unenhanced exam no solid renal masses are identified.   LT KIDNEY: No left sided urinary tract stones are identified.  There is a 4.1 cm cyst midpole left kidney laterally. Within the limits of this unenhanced exam no solid renal masses are identified.   There is an IVC filter.  There is no evidence for abdominal aortic aneurysm. There is calcific plaque within the abdominal aorta. No retroperitoneal lymphadenopathy is identified.   There is a subacute/chronic appearing femoral neck fracture which has not been treated. There is impaction at the fracture site with varus deformity. A subacute/chronic left femoral neck fracture was described on the outside report. Please correlate with clinical history as to whether this requires orthopedic intervention for fixation with a left hip prosthesis. Patient is status post right hip prosthesis placement along with a femoral screw and plate fixation device laterally.   There are wedge fractures of T7, T11, L1, and L2. The L2 fracture appears to be acute/subacute. The T12 fracture appears acute/subacute.   Bowel loops are unremarkable in appearance although partially obscured by motion artifact.   PELVIC CT: No pathologic masses or fluid collections are identified. Caldwell catheter is present within the urinary bladder.       1. Left femoral neck fracture likely subacute or chronic. Orthopedic consultation is recommended for assessment of appropriate treatment. 2. Multiple vertebral body compression fractures as above with the L2 and T12 fracture is thought to be acute/subacute. 3. Left renal cyst. 4. Limited exam due to motion.   MACRO: none   Signed by: Mario Menchaca 2/5/2025 3:21 PM Dictation workstation:   [id number]    XR chest 1 view    Result Date: 2/5/2025  Interpreted By:  Schoenberger, Joseph, STUDY: XR CHEST 1 VIEW;  2/5/2025 10:28 am   INDICATION: Signs/Symptoms:weak.     COMPARISON: None.   ACCESSION NUMBER(S): [id number]   ORDERING CLINICIAN: KRISTEN BAEZ   FINDINGS:         CARDIOMEDIASTINAL SILHOUETTE: Cardiomediastinal silhouette is normal in size and configuration.   LUNGS: Lungs are clear.   ABDOMEN: No remarkable upper abdominal findings.   BONES: No acute osseous changes.       1.  No evidence of acute cardiopulmonary process.        MACRO: None   Signed by: Joseph Schoenberger 2/5/2025 10:30 AM Dictation workstation:   [id number]    CT head wo IV contrast    Result Date: 1/12/2025  EXAMINATION: CT HEAD W/O CONTRAST 01/12/2025 01:42 PM CLINICAL HISTORY: Trauma; fall ASSOCIATED DIAGNOSIS: Trauma fall ORDERING PROVIDER: RICHELLE WHITTINGTON TECHNRUCHI NOTE: COMPARISON: CT HEAD W/O CONTRAST 1/4/2025, 10:57 AM TECHNIQUE: Thin axial imaging of the head was performed without intravenous contrast. FINDINGS: No mass or acute hemorrhage. No evidence of acute infarct. Moderate generalized brain parenchymal volume loss with commensurate ventricular caliber. Patchy and confluent foci of white matter hypoattenuation, most likely moderate chronic microvascular angiopathy. There are atherosclerotic calcifications of the carotid siphons. Mild paranasal sinus mucosal thickening. There are a couple of mucous retention cysts within the left maxillary sinus. No acute osseous abnormalities.   IMPRESSION: No acute intracranial abnormality. MACRO: None I have personally reviewed the images and agree with the resident's interpretation.    CT cervical spine wo IV contrast    Result Date: 1/12/2025  EXAMINATION: CT C-SPINE W/O CONTRAST 01/12/2025 01:42 PM CLINICAL HISTORY: Trauma; fall ASSOCIATED DIAGNOSIS: Trauma fall ORDERING PROVIDER: RICHELLE WHITTINGTON TECHNRUCHI NOTE: COMPARISON: None TECHNIQUE: Thin isotropic axial images were obtained from the skull base to the upper thoracic spine without intravenous contrast. 2D sagittal and coronal reconstructions were obtained from the axial data. FINDINGS: Vertebrae: No acute fracture or traumatic malalignment. No aggressive osseous lesions. Mild multilevel spondylosis causing at least mild canal narrowing, greatest at C6-C7. Soft Tissues: No acute abnormality. IMPRESSION: No acute cervical spine fracture or traumatic malalignment. MACRO: None I have personally reviewed the images and agree with the resident's interpretation.    CT  T-SPINE/L-SPINE W/O CONTRAST    Result Date: 1/12/2025  EXAMINATION: CT T-SPINE/L-SPINE W/O CONTRASTPRO/PRO   01/12/2025 01:42 PM CLINICAL HISTORY: Trauma; fall ASSOCIATED DIAGNOSIS: Trauma fall ORDERING PROVIDER: RICHELLE WHITTINGTON TECHNOLOGISTS NOTE: COMPARISON: CT of the chest abdomen and pelvis the same day. CT T-SPINE/L-SPINE W/O CONTRAST 8/11/2024, 3:31 PM TECHNIQUE: Thin axial images were obtained through the thoracic and lumbar spine without intravenous contrast. 2D sagittal and coronal reconstructions were obtained from the axial data. FINDINGS: Severe osteopenia is present. There is one third loss of height of L2 vertebral body with a prominent Schmorl's node at the superior endplate. This is late subacute to chronic and there is interval loss of height as compared to prior MR of 8/14/2024. There is chronic loss of height of L1 which is grossly unchanged from the prior MRI. Anterior wedging and loss of height of T11 vertebral body is again seen and is grossly unchanged. Multilevel degenerative changes are present in the thoracolumbar spine. Mild loss of height of T7 and T8 vertebral bodies appear chronic. The pedicles and facets are intact. Visible sacrum and pelvis: No acute abnormality. IMPRESSION: Prominent osteopenia. There is interval progression of loss of height of L2 vertebral body. This is late subacute to chronic. Loss of height of additional thoracolumbar vertebral bodies as described above. MACRO: None I have personally reviewed the images and agree with the resident's interpretation.    CT chest abdomen pelvis w IV contrast    Result Date: 1/12/2025  EXAMINATION: CT CHEST/ABD/PELVIS W/ CONTRAST 01/12/2025 01:42 PM CLINICAL HISTORY: Trauma; fall ASSOCIATED DIAGNOSIS: Trauma fall ORDERING PROVIDER: RICHELLE WHITTINGTON TECHNOLOGISTS NOTE: COMPARISON: CT CHEST/ABD/PELVIS W/ 9/27/2018, 6:03 PM TECHNIQUE: Contiguous axial images were obtained through the chest abdomen and pelvis from the level of the thoracic inlet  through the pubic symphysis following administration of  intravenous contrast. MPR sagittal and coronal reconstructions were obtained from the axial data. Before infusion of intravenous contrast, radiology personnel investigated the possibility of an allergic history and of any history of reaction to iodinated contrast material. Contrast Protocol: Omnipaque 350 [>or =100lb] 100 ml [<100 lb] 1 ml per 1 lb. INTRA-PROCEDURE MEDS: iohexol (OMNIPAQUE) 350 MG/ML injection 100 mL Route: Intravenous Push FINDINGS: Cardiovasculature: The heart is normal in size. Atherosclerotic calcifications are present within the coronary arteries. Mediastinum/Pericardium: Unremarkable Pleura: Unremarkable Central Airways: Widely patent Lungs: No focal consolidation. Nodules: There are a few stable scattered subcentimeter pulmonary nodules not requiring dedicated imaging follow-up. Lymph Nodes: No thoracic lymphadenopathy is evident. Hepatobiliary: There is a stable subcentimeter low-density lesion in the dome, likely cyst or hemangioma. No biliary dilation. Pancreas: Unremarkable Spleen: Unremarkable Adrenal Glands: Unremarkable Kidneys, ureters, and bladder: No calculi or hydroureteronephrosis. Multiple Bosniak I and/or Bosniak II renal cysts which do not meet imaging criteria for follow up. Small amount of gas in the urinary bladder. Abdominal and pelvic vasculature: Atherosclerotic wall calcifications are present without abdominal aortic aneurysm. Inferior vena cava filter. GI tract: No evidence of obstruction. The appendix is within normal limits. Colonic diverticulosis is present without evidence for diverticulitis. Peritoneum and retroperitoneum: No free fluid or free air. Lymph Nodes: No lymphadenopathy. Prostate and seminal vesicles: Unremarkable Visualized musculoskeletal structures: Subacute-chronic appearing fracture of left femoral neck. Postsurgical changes and hardware of right proximal femur/arthroplasty, appearing intact.  Gynecomastia. Fracture deformities of the spine. Degenerative changes of the spine and shoulders. IMPRESSION: 1.  Subacute to chronic appearing fracture of left femoral neck. Please correlate with history and any acute pain in the region. 2.  Fracture deformities of the spine. Please see the separate interpretation of same-day CT spine exams for acuity descriptions. 3.  No other potentially acute traumatic injury is identified involving chest abdomen and pelvis. 4.  Nonspecific gas in the urinary bladder lumen. Please correlate with any recent instrumentation. 5.  Please see descriptions of other ancillary findings above. MACRO: None           Assessment/Plan    Acute kidney injury secondary to obstructive uropathy  Disease  Hyponatremia secondary to SIADH  Simple renal cyst  Thrombocytopenia  Simple renal cyst  Benign prostatic enlargement  Diabetes  Secondary hyperparathyroidism of renal origin  Hyperuricemia  Plan  Continue fluid resuscitation  Calcitriol  ; Will check PSA  Consider neurology evaluation for confusion          Assessment & Plan  CELESTE (acute kidney injury) (CMS-HCC)              I spent 30 minutes in the professional and overall care of this patient.      Cristobal Bautista MD

## 2025-02-07 NOTE — PROGRESS NOTES
02/07/25 1636   Discharge Planning   Living Arrangements Other (Comment)   Type of Residence Nursing home/residential care   Expected Discharge Disposition Inter   Does the patient need discharge transport arranged? Yes   RoundTrip coordination needed? Yes     Patient with Caldwell catheter and TOV planned for tomorrow 2/8.  Plan remains for patient to return to [facility] where he was receiving skilled care; he does not need precert to return.  Care Coordination team following.  Tana SHEIKH TCC

## 2025-02-07 NOTE — PROGRESS NOTES
"Vineet Lira is a 69 y.o. male on day 2 of admission presenting with CELESTE (acute kidney injury) (CMS-[facility]).    Subjective   Patient stating \"I'm not happy with the way things are going here\".    Patient trying to tell me about his concerns regarding Gonzales, but frequently losing his train of thought and unable to express his concerns. He stated \"It's like two rubber bands\". When I asked him to clarify what he meant he just said \"How can I put this?\", but was unable to elaborate. He then stated \"when they did the endoscopy it was not normal\".    He denies suprapubic pressure/fullness or burning. States it \"hurts\" and did endorse pain, but \"right at the tip of the penis\". When asked about fever or chills he states \"not that I recall\".    He was found, once more, to NOT have a Gonzales stat lock in place. Nursing believes patient has been picking at things. When I asked him if he remembers removing a sticker on his leg he said \"not that I recall\".  I instructed him to leave Stat lock in place and not touch gonzales and he said \"Yes Ma'Am\".        Objective     Physical Exam  Vitals reviewed.   Cardiovascular:      Rate and Rhythm: Normal rate.   Pulmonary:      Effort: Pulmonary effort is normal. No respiratory distress.   Abdominal:      General: Bowel sounds are normal. There is no distension.      Palpations: Abdomen is soft.   Genitourinary:     Penis: Normal.       Comments: Gonzales catheter- initially NO STAT lock was in place- I personally placed securement device. Clear yellow urine. No sediment. Not cloudy.    Musculoskeletal:      Right lower leg: Edema present.      Left lower leg: Edema present.      Comments: SCDs. 1+ pitting BLE   Skin:     General: Skin is warm.      Coloration: Skin is not jaundiced.   Neurological:      Mental Status: He is alert and oriented to person, place, and time.   Psychiatric:         Speech: Speech is tangential.         Behavior: Behavior is slowed.         Cognition and Memory: " "Cognition is impaired.       Last Recorded Vitals  Blood pressure 115/61, pulse 93, temperature 35.7 °C (96.3 °F), resp. rate 18, height 1.778 m (5' 10\"), weight 103 kg (227 lb 11.2 oz), SpO2 97%.  Intake/Output last 3 Shifts:  I/O last 3 completed shifts:  In: 3481.3 (33.7 mL/kg) [I.V.:3381.3 (32.7 mL/kg); IV Piggyback:100]  Out: 800 (7.7 mL/kg) [Urine:800 (0.2 mL/kg/hr)]  Weight: 103.3 kg     Relevant Results  Results for orders placed or performed during the hospital encounter of 02/05/25 (from the past 24 hours)   POCT GLUCOSE   Result Value Ref Range    POCT Glucose 126 (H) 74 - 99 mg/dL   POCT GLUCOSE   Result Value Ref Range    POCT Glucose 122 (H) 74 - 99 mg/dL   POCT GLUCOSE   Result Value Ref Range    POCT Glucose 119 (H) 74 - 99 mg/dL   CBC   Result Value Ref Range    WBC 7.9 4.4 - 11.3 x10*3/uL    nRBC 0.0 0.0 - 0.0 /100 WBCs    RBC 3.71 (L) 4.50 - 5.90 x10*6/uL    Hemoglobin 12.0 (L) 13.5 - 17.5 g/dL    Hematocrit 34.1 (L) 41.0 - 52.0 %    MCV 92 80 - 100 fL    MCH 32.3 26.0 - 34.0 pg    MCHC 35.2 32.0 - 36.0 g/dL    RDW 12.6 11.5 - 14.5 %    Platelets 150 150 - 450 x10*3/uL   Basic Metabolic Panel   Result Value Ref Range    Glucose 110 (H) 74 - 99 mg/dL    Sodium 130 (L) 136 - 145 mmol/L    Potassium 4.2 3.5 - 5.3 mmol/L    Chloride 94 (L) 98 - 107 mmol/L    Bicarbonate 25 21 - 32 mmol/L    Anion Gap 15 10 - 20 mmol/L    Urea Nitrogen 84 (H) 6 - 23 mg/dL    Creatinine 1.93 (H) 0.50 - 1.30 mg/dL    eGFR 37 (L) >60 mL/min/1.73m*2    Calcium 9.1 8.6 - 10.3 mg/dL   POCT GLUCOSE   Result Value Ref Range    POCT Glucose 99 74 - 99 mg/dL   Albumin-Creatinine Ratio, Urine Random   Result Value Ref Range    Albumin, Urine Random 68.3 Not established mg/L    Creatinine, Urine Random 135.4 20.0 - 370.0 mg/dL    Albumin/Creatinine Ratio 50.4 (H) <30.0 ug/mg Creat   Urine electrolytes   Result Value Ref Range    Sodium, Urine Random <10 mmol/L    Sodium/Creatinine Ratio      Potassium, Urine Random 37 mmol/L    " Potassium/Creatinine Ratio 27 Not established mmol/g Creat    Chloride, Urine Random <15 mmol/L    Chloride/Creatinine Ratio      Creatinine, Urine Random 135.4 20.0 - 370.0 mg/dL   Urea Nitrogen, Urine Random   Result Value Ref Range    Urea Nitrogen, Urine Random 653 mg/dL    Creatinine, Urine Random 135.4 20.0 - 370.0 mg/dL    Urea Nitrogen/Creatinine Ratio 4.8 Not established. g/g creat           Assessment/Plan   Assessment & Plan  CELESTE (acute kidney injury) (CMS-[facility])    70 yo male presented to [facility] ED on 2/5 from SNF due to worsening renal function on labs. Patient with acute urinary retention (Bladder scan 477; initial output after Gonzales placement: 500). Also with CELESTE: BUN 80/creatinine 6.31 on 2/4/25 (BUN 19/creatinine 0.63 on 1/12/2025) with unclear etiology. Pt experiencing LUTS for the past few years. UA with 1+ bacteria, 11-20 WBC, but negative leuk esterase/nitrates. Urine culture negative. Blood cx NGTD. CT A/P with left renal cyst and unremarkable Renal US.     Patient with past medical history significant for obesity, seizures, frequent falls, BPH, GERD, vitamin B12 deficiency, depression, hypothyroidism, HLD, HTN, and DM.      Impression:  Acute urinary retention with LUTS (477 bladder scan)  CELESTE 2/2 obstructive uropathy - improved    Recommendations:  - Maintain gonzales catheter today for decompression    > TOV is reasonable this admission. If fails, would discharge back to SNF with gonzales.  - Continue Flomax 0.4mg    > if patient fails TOV, can increase to 0.8mg nightly as long as BP is acceptable   - Nephrology consult; daily RFP to trend creatinine: 1.93 today.    The patient was discussed with the attending surgeon Dr. Borja    Urology service will peripherally follow; will place orders for TOV closer to planned discharge (likely Monday as patient is from SNF and unlikely to discharge over weekend).       I spent 25 minutes in the professional and overall care of this patient.      Renetta  SHREYAS Bellamy, REYES-CNP

## 2025-02-07 NOTE — PROGRESS NOTES
Vineet Lira is a 69 y.o. male on day 2 of admission presenting with CELESTE (acute kidney injury) (CMS-[facility]).      Subjective   No events overnight. Patient seen and examined at bedside. He has no complaints.        Objective     Last Recorded Vitals  /63   Pulse 92   Temp 36.2 °C (97.2 °F)   Resp 18   Wt 103 kg (227 lb 11.2 oz)   SpO2 95%   Intake/Output last 3 Shifts:    Intake/Output Summary (Last 24 hours) at 2/7/2025 1315  Last data filed at 2/7/2025 1006  Gross per 24 hour   Intake 3631.25 ml   Output 1500 ml   Net 2131.25 ml       Admission Weight  Weight: 113 kg (249 lb) (02/05/25 0948)    Daily Weight  02/07/25 : 103 kg (227 lb 11.2 oz)    Image Results  ECG 12 Lead  Sinus tachycardia  Probable left atrial enlargement  Left anterior fascicular block  LVH with secondary repolarization abnormality  Borderline ST elevation, lateral leads  XR hip left with pelvis when performed 2 or 3 views  Narrative: Interpreted By:  Jose M Lo,   STUDY:  XR HIP LEFT WITH PELVIS WHEN PERFORMED 2 OR 3 VIEWS 2/5/2025 6:51 pm      INDICATION:  Signs/Symptoms:Left femoral neck fracture nonunion      COMPARISON:  None available.      ACCESSION NUMBER(S):  [id number]      ORDERING CLINICIAN:  MELISSA WILSON      TECHNIQUE:  AP view of the pelvis with AP and lateral views of the left hip.      FINDINGS:  Deformed prior left femoral neck fracture with callus formation. No  new acute fractures. Prior right hip arthroplasty.      Impression: 1. Deformed prior left femoral neck fracture with callus formation.  No new acute fractures      Signed by: Jose M Lo 2/6/2025 7:21 AM  Dictation workstation:   [id number]      Physical Exam  Constitutional:       General: He is not in acute distress.     Appearance: He is obese.   HENT:      Head: Normocephalic and atraumatic.      Mouth/Throat:      Mouth: Mucous membranes are dry.      Pharynx: Oropharynx is clear.   Eyes:      Extraocular Movements: Extraocular movements  intact.      Conjunctiva/sclera: Conjunctivae normal.      Pupils: Pupils are equal, round, and reactive to light.   Cardiovascular:      Rate and Rhythm: Regular rhythm. Tachycardia present.      Pulses: Normal pulses.      Heart sounds: Normal heart sounds.   Pulmonary:      Effort: Pulmonary effort is normal. No respiratory distress.      Breath sounds: Normal breath sounds. No wheezing.   Abdominal:      General: Bowel sounds are normal.      Palpations: Abdomen is soft.      Tenderness: There is abdominal tenderness (Generalized abdominal tenderness with palpation).   Genitourinary:     Comments: Caldwell catheter in place, good urine output, dark in color.  Musculoskeletal:      Right lower leg: Edema present.      Left lower leg: Edema present.   Skin:     General: Skin is warm and dry.   Neurological:      Mental Status: He is alert. Mental status is at baseline.      Motor: Weakness present.   Psychiatric:         Mood and Affect: Mood normal.         Behavior: Behavior normal.      Relevant Results               Assessment/Plan      Vineet Lira is a 69 y.o. male presents from nursing facility for worsening kidney function on lab work yesterday.  History somewhat limited from patient.  Does admit to occasional mild nausea, generalized abdominal pain, and decreased appetite/oral intake over the past 3-4 days.  Does report he has not been peeing as much over the past week or so.  States this has happened in the past, however is unable to tell me any more information about it. Denies any pain with urination or bleeding.  Does report he has been feeling weaker, and of note, patient seen multiple times in the past month at [facility] for evaluation after falls. Patient currently denying any symptoms in the ED aside from mild abdominal discomfort.  States he feels like he has not had an appetite recently.      CODE STATUS: Full code     #Acute kidney injury  #Acute urinary retention  #Mild hyponatremia,  132  #Hypochloremia, 90  #Tachycardia  #Abdominal pain, nausea, decreased appetite  Admit as inpatient with telemetry monitoring  Urinalysis shows yellow and turbid appearance with 1+ protein, trace glucose, 1+ blood, negative nitrites, negative leukocyte esterase, 11-20 WBCs, 1+ bacteria, 2+ hyaline cast, 2+ granular casts  Nephrology consult  Urology consult  -BUN 90/creatinine 6.36 today, BUN 80/creatinine 6.31 on 2/4/25 (BUN 19/creatinine 0.63 on 1/12/2025)  Blood culture/urine culture pending  Continuous IV fluids, given 500 mL NS bolus in the ED  Hold off on nephrotoxic medications  Bilateral renal ultrasound ordered  CT abdomen/pelvis without contrast ordered  Urine osmolality/urine sodium ordered, CK added onto lab work  Tylenol, Zofran as needed  N.p.o. until CT abdomen/pelvis results  Q 4 vitals  CBC, BMP in the a.m.  Will start on IV Rocephin empirically until urine culture results  -Per home medication review, patient takes 100 mg losartan daily and 20 mg Lipitor daily, will hold.  Consider cardiology consult for tachycardia pending clinical course, will defer to attending.     -1605, notified by RN that CT abdomen/pelvis resulted.  Shows left femoral neck fracture likely subacute or chronic, orthopedic consultation recommended.  She has multiple vertebral body compression fractures with the L2 and T12 fracture, thought to be acute/subacute, left renal cyst.  Similar findings on CT abdomen/pelvis from 1/12 after patient was evaluated after a fall, however per my EMR review does not appear Ortho was consulted at that time.  Will consult orthopedic surgery team for further evaluation.     #Diabetes mellitus  Sliding scale insulin  ACHS Accu-Cheks     #Thrombocytopenia  Platelets 144 today, platelets 184 on 1/12  Monitor with a.m. labs     #Generalized weakness/history of frequent falls  PT/OT for evaluation and treatment  Bed alarm, fall precautions     Continue home medications when med rec is completed      Chronic conditions:  Obesity, history of seizures, frequent falls, BPH, GERD, depression, hypothyroidism, vitamin B12 deficiency, DM, HLD, HTN     #DVT prophylaxis  SCDs, ambulation as tolerated  Heparin     2/6: Caldwell placed for urinary retention. Started on Flomax per Urology. On IVF. Creatinine improved from 6.3 marianne nto 4.1. Patient with 1.25L urine output over 24 hours. Decrease amlodipine and increase metoprolol given soft BP and tachycardia.    2/7: Creatinine improved from 4.1 down to 1.9. BUN also improved from 96 down to 84. Patient is at his mental baseline. Suspect any confusion was related to uremia. TOV this admission per Urology, will attempt tomorrow. Can consider outpatient surgery for femoral neck nonunion. However, patient states he has not walked in over 6 months and does not think he will be able to walk in the future, even with a surgery.    This patient has a urinary catheter   Reason for the urinary catheter remaining today? urinary retention/bladder outlet obstruction, acute or chronic         Kyle Jackson, DO

## 2025-02-07 NOTE — CARE PLAN
The patient's goals for the shift include      The clinical goals for the shift include pt will remain hemodynamically stable throught shift    Over the shift, the patient did make progress toward the following goals.

## 2025-02-08 VITALS
BODY MASS INDEX: 33.18 KG/M2 | DIASTOLIC BLOOD PRESSURE: 64 MMHG | RESPIRATION RATE: 18 BRPM | OXYGEN SATURATION: 96 % | TEMPERATURE: 97.2 F | SYSTOLIC BLOOD PRESSURE: 123 MMHG | HEIGHT: 70 IN | WEIGHT: 231.8 LBS | HEART RATE: 80 BPM

## 2025-02-08 LAB
ANION GAP SERPL CALC-SCNC: 15 MMOL/L (ref 10–20)
BUN SERPL-MCNC: 65 MG/DL (ref 6–23)
CALCIUM SERPL-MCNC: 9 MG/DL (ref 8.6–10.3)
CHLORIDE SERPL-SCNC: 94 MMOL/L (ref 98–107)
CO2 SERPL-SCNC: 25 MMOL/L (ref 21–32)
CREAT SERPL-MCNC: 1.13 MG/DL (ref 0.5–1.3)
EGFRCR SERPLBLD CKD-EPI 2021: 70 ML/MIN/1.73M*2
ERYTHROCYTE [DISTWIDTH] IN BLOOD BY AUTOMATED COUNT: 12.5 % (ref 11.5–14.5)
GLUCOSE BLD MANUAL STRIP-MCNC: 115 MG/DL (ref 74–99)
GLUCOSE BLD MANUAL STRIP-MCNC: 118 MG/DL (ref 74–99)
GLUCOSE SERPL-MCNC: 114 MG/DL (ref 74–99)
HCT VFR BLD AUTO: 33.7 % (ref 41–52)
HGB BLD-MCNC: 11.4 G/DL (ref 13.5–17.5)
MCH RBC QN AUTO: 31.7 PG (ref 26–34)
MCHC RBC AUTO-ENTMCNC: 33.8 G/DL (ref 32–36)
MCV RBC AUTO: 94 FL (ref 80–100)
NRBC BLD-RTO: 0 /100 WBCS (ref 0–0)
PLATELET # BLD AUTO: 177 X10*3/UL (ref 150–450)
POTASSIUM SERPL-SCNC: 4.3 MMOL/L (ref 3.5–5.3)
RBC # BLD AUTO: 3.6 X10*6/UL (ref 4.5–5.9)
SODIUM SERPL-SCNC: 130 MMOL/L (ref 136–145)
WBC # BLD AUTO: 8.3 X10*3/UL (ref 4.4–11.3)

## 2025-02-08 PROCEDURE — 2500000004 HC RX 250 GENERAL PHARMACY W/ HCPCS (ALT 636 FOR OP/ED): Performed by: PHYSICIAN ASSISTANT

## 2025-02-08 PROCEDURE — 99231 SBSQ HOSP IP/OBS SF/LOW 25: CPT

## 2025-02-08 PROCEDURE — 85027 COMPLETE CBC AUTOMATED: CPT | Performed by: INTERNAL MEDICINE

## 2025-02-08 PROCEDURE — 2500000001 HC RX 250 WO HCPCS SELF ADMINISTERED DRUGS (ALT 637 FOR MEDICARE OP): Performed by: INTERNAL MEDICINE

## 2025-02-08 PROCEDURE — 82947 ASSAY GLUCOSE BLOOD QUANT: CPT

## 2025-02-08 PROCEDURE — 36415 COLL VENOUS BLD VENIPUNCTURE: CPT | Performed by: INTERNAL MEDICINE

## 2025-02-08 PROCEDURE — 80048 BASIC METABOLIC PNL TOTAL CA: CPT | Performed by: INTERNAL MEDICINE

## 2025-02-08 PROCEDURE — 2500000005 HC RX 250 GENERAL PHARMACY W/O HCPCS: Performed by: PHYSICIAN ASSISTANT

## 2025-02-08 PROCEDURE — 2500000001 HC RX 250 WO HCPCS SELF ADMINISTERED DRUGS (ALT 637 FOR MEDICARE OP): Performed by: PHYSICIAN ASSISTANT

## 2025-02-08 PROCEDURE — 2500000002 HC RX 250 W HCPCS SELF ADMINISTERED DRUGS (ALT 637 FOR MEDICARE OP, ALT 636 FOR OP/ED): Performed by: PHYSICIAN ASSISTANT

## 2025-02-08 RX ORDER — DIVALPROEX SODIUM 250 MG/1
250 TABLET, DELAYED RELEASE ORAL 2 TIMES DAILY
Start: 2025-02-08

## 2025-02-08 RX ORDER — LOSARTAN POTASSIUM 25 MG/1
25 TABLET ORAL EVERY MORNING
Start: 2025-02-08

## 2025-02-08 RX ORDER — ONDANSETRON 4 MG/1
4 TABLET, FILM COATED ORAL EVERY 6 HOURS PRN
Qty: 20 TABLET | Refills: 0 | Status: SHIPPED | OUTPATIENT
Start: 2025-02-08

## 2025-02-08 RX ORDER — CYCLOBENZAPRINE HCL 10 MG
10 TABLET ORAL NIGHTLY
Status: DISCONTINUED | OUTPATIENT
Start: 2025-02-09 | End: 2025-02-08 | Stop reason: HOSPADM

## 2025-02-08 RX ORDER — ERGOCALCIFEROL 1.25 MG/1
1250 CAPSULE ORAL WEEKLY
Start: 2025-02-15

## 2025-02-08 RX ORDER — METOPROLOL SUCCINATE 50 MG/1
50 TABLET, EXTENDED RELEASE ORAL 2 TIMES DAILY
Start: 2025-02-08

## 2025-02-08 RX ORDER — ERGOCALCIFEROL 1.25 MG/1
1250 CAPSULE ORAL WEEKLY
Status: DISCONTINUED | OUTPATIENT
Start: 2025-02-08 | End: 2025-02-08 | Stop reason: HOSPADM

## 2025-02-08 RX ADMIN — HEPARIN SODIUM 5000 UNITS: 5000 INJECTION INTRAVENOUS; SUBCUTANEOUS at 10:02

## 2025-02-08 RX ADMIN — HEPARIN SODIUM 5000 UNITS: 5000 INJECTION INTRAVENOUS; SUBCUTANEOUS at 01:01

## 2025-02-08 RX ADMIN — PHENOBARBITAL 32.4 MG: 32.4 TABLET ORAL at 09:51

## 2025-02-08 RX ADMIN — LEVOTHYROXINE SODIUM 50 MCG: 0.05 TABLET ORAL at 05:53

## 2025-02-08 RX ADMIN — ARIPIPRAZOLE 5 MG: 5 TABLET ORAL at 09:51

## 2025-02-08 RX ADMIN — ASPIRIN 81 MG CHEWABLE TABLET 81 MG: 81 TABLET CHEWABLE at 09:52

## 2025-02-08 RX ADMIN — TAMSULOSIN HYDROCHLORIDE 0.4 MG: 0.4 CAPSULE ORAL at 09:51

## 2025-02-08 RX ADMIN — CETIRIZINE HYDROCHLORIDE 10 MG: 10 TABLET, FILM COATED ORAL at 09:51

## 2025-02-08 RX ADMIN — DIVALPROEX SODIUM 250 MG: 250 TABLET, DELAYED RELEASE ORAL at 09:51

## 2025-02-08 RX ADMIN — AMLODIPINE BESYLATE 2.5 MG: 5 TABLET ORAL at 09:51

## 2025-02-08 RX ADMIN — METOPROLOL SUCCINATE 50 MG: 50 TABLET, EXTENDED RELEASE ORAL at 09:51

## 2025-02-08 RX ADMIN — POLYVINYL ALCOHOL, POVIDONE 1 DROP: 14; 6 SOLUTION/ DROPS OPHTHALMIC at 09:52

## 2025-02-08 RX ADMIN — ERGOCALCIFEROL 1250 MCG: 1.25 CAPSULE ORAL at 14:37

## 2025-02-08 RX ADMIN — CEFTRIAXONE SODIUM 1 G: 1 INJECTION, SOLUTION INTRAVENOUS at 14:09

## 2025-02-08 ASSESSMENT — COGNITIVE AND FUNCTIONAL STATUS - GENERAL
DRESSING REGULAR LOWER BODY CLOTHING: A LOT
STANDING UP FROM CHAIR USING ARMS: A LOT
CLIMB 3 TO 5 STEPS WITH RAILING: TOTAL
DAILY ACTIVITIY SCORE: 12
HELP NEEDED FOR BATHING: A LOT
MOVING TO AND FROM BED TO CHAIR: A LOT
MOVING FROM LYING ON BACK TO SITTING ON SIDE OF FLAT BED WITH BEDRAILS: A LOT
WALKING IN HOSPITAL ROOM: A LOT
MOBILITY SCORE: 11
EATING MEALS: A LOT
DRESSING REGULAR UPPER BODY CLOTHING: A LOT
TURNING FROM BACK TO SIDE WHILE IN FLAT BAD: A LOT
TOILETING: A LOT
PERSONAL GROOMING: A LOT

## 2025-02-08 ASSESSMENT — PAIN SCALES - GENERAL: PAINLEVEL_OUTOF10: 0 - NO PAIN

## 2025-02-08 NOTE — CARE PLAN
The patient's goals for the shift include      The clinical goals for the shift include pt will remain hemodynamically stable throught shift    Over the shift, the patient did not make progress toward the following goals. Barriers to progression include confusion. Recommendations to address these barriers include reorient patient.

## 2025-02-08 NOTE — PROGRESS NOTES
"Vineet Lira is a 69 y.o. male on day 3 of admission presenting with CELESTE (acute kidney injury) (CMS-[facility]).    Subjective   Patient stating \"I'm not happy with the way things are going here\".    Patient trying to tell me about his concerns regarding Gonzales, but frequently losing his train of thought and unable to express his concerns. He stated \"It's like two rubber bands\". When I asked him to clarify what he meant he just said \"How can I put this?\", but was unable to elaborate. He then stated \"when they did the endoscopy it was not normal\".    He denies suprapubic pressure/fullness or burning. States it \"hurts\" and did endorse pain, but \"right at the tip of the penis\". When asked about fever or chills he states \"not that I recall\".    He was found, once more, to NOT have a Gonzales stat lock in place. Nursing believes patient has been picking at things. When I asked him if he remembers removing a sticker on his leg he said \"not that I recall\".  I instructed him to leave Stat lock in place and not touch gonzales and he said \"Yes Ma'Am\".        Objective     Physical Exam  Vitals reviewed.   Constitutional:       General: He is sleeping. He is not in acute distress.     Appearance: He is obese. He is ill-appearing. He is not toxic-appearing.      Comments: Somewhat difficult to awaken, unable to answer questions.    Pulmonary:      Effort: Pulmonary effort is normal. No respiratory distress.   Abdominal:      General: Bowel sounds are normal. There is no distension.      Palpations: Abdomen is soft.   Genitourinary:     Penis: Normal.       Comments: Gonzales catheter, secured. No sediment. Not cloudy.    Skin:     General: Skin is warm.      Coloration: Skin is not jaundiced.   Neurological:      Mental Status: He is confused.      Comments: Drowsy, unable to answer questions this AM.    Psychiatric:         Behavior: Behavior normal.         Last Recorded Vitals  Blood pressure 117/66, pulse 89, temperature 36.5 °C (97.7 " "°F), resp. rate 18, height 1.778 m (5' 10\"), weight 105 kg (231 lb 12.8 oz), SpO2 96%.  Intake/Output last 3 Shifts:  I/O last 3 completed shifts:  In: 900 (8.7 mL/kg) [P.O.:850; IV Piggyback:50]  Out: 2100 (20.3 mL/kg) [Urine:2100 (0.6 mL/kg/hr)]  Weight: 103.3 kg     Relevant Results  Results for orders placed or performed during the hospital encounter of 02/05/25 (from the past 24 hours)   POCT GLUCOSE   Result Value Ref Range    POCT Glucose 127 (H) 74 - 99 mg/dL   POCT GLUCOSE   Result Value Ref Range    POCT Glucose 125 (H) 74 - 99 mg/dL   POCT GLUCOSE   Result Value Ref Range    POCT Glucose 162 (H) 74 - 99 mg/dL   CBC   Result Value Ref Range    WBC 8.3 4.4 - 11.3 x10*3/uL    nRBC 0.0 0.0 - 0.0 /100 WBCs    RBC 3.60 (L) 4.50 - 5.90 x10*6/uL    Hemoglobin 11.4 (L) 13.5 - 17.5 g/dL    Hematocrit 33.7 (L) 41.0 - 52.0 %    MCV 94 80 - 100 fL    MCH 31.7 26.0 - 34.0 pg    MCHC 33.8 32.0 - 36.0 g/dL    RDW 12.5 11.5 - 14.5 %    Platelets 177 150 - 450 x10*3/uL   Basic Metabolic Panel   Result Value Ref Range    Glucose 114 (H) 74 - 99 mg/dL    Sodium 130 (L) 136 - 145 mmol/L    Potassium 4.3 3.5 - 5.3 mmol/L    Chloride 94 (L) 98 - 107 mmol/L    Bicarbonate 25 21 - 32 mmol/L    Anion Gap 15 10 - 20 mmol/L    Urea Nitrogen 65 (H) 6 - 23 mg/dL    Creatinine 1.13 0.50 - 1.30 mg/dL    eGFR 70 >60 mL/min/1.73m*2    Calcium 9.0 8.6 - 10.3 mg/dL   POCT GLUCOSE   Result Value Ref Range    POCT Glucose 115 (H) 74 - 99 mg/dL           Assessment/Plan   70 yo male presented to [facility] ED on 2/5 from SNF due to worsening renal function on labs. Patient with acute urinary retention (Bladder scan 477; initial output after Caldwell placement: 500). Also with CELESTE: BUN 80/creatinine 6.31 on 2/4/25 (BUN 19/creatinine 0.63 on 1/12/2025) with unclear etiology. Pt experiencing LUTS for the past few years. UA with 1+ bacteria, 11-20 WBC, but negative leuk esterase/nitrates. Urine culture negative. Blood cx NGTD. CT A/P with left renal " cyst and unremarkable Renal US.     Patient with past medical history significant for obesity, seizures, frequent falls, BPH, GERD, vitamin B12 deficiency, depression, hypothyroidism, HLD, HTN, and DM.      Impression:  Acute urinary retention with LUTS (477 bladder scan)  CELESTE 2/2 obstructive uropathy - improved, nearly resolved    Recommendations:  - Gonzales to be removed this AM for TOV    > If bladder scan >300, replace gonzales and discharge to SNF with gonzales in place  - Continue Flomax 0.4mg    > if patient fails TOV, can increase to 0.8mg nightly as long as BP is acceptable   - Keep scheduled follow-up with Dr. Borja on 2/18    Dispo: okay for discharge after TOV from urology standpoint. Likely discharge back to long term care facility later today.    Urology will follow peripherally until TOV is completed, then sign off.     Felicia Tristan PA-C

## 2025-02-08 NOTE — CARE PLAN
The patient's goals for the shift include      The clinical goals for the shift include safety    Over the shift, the patient did make progress toward the following goals.

## 2025-02-08 NOTE — PROGRESS NOTES
Subjective   Patient is alert today  Renal chemistries back to baseline, still with mild hyponatremia  He has elevation of the PTH and discussions he states that he was told he elevation of the parathyroid gland levels more than 20 years ago.  And he was prescribed vitamin D for it but he stopped using it recently.  I advised him that he might benefit from repeating ultrasound of the parathyroid glands or sestamibi scan to rule out primary hyperparathyroidism.  Patient refers that at this moment he will take a vitamin D and follow-up PTH levels within the next 2 to 3 months  He have mild hyperuricemia with a uric acid   Of 8.9         Vitals 24HR  Heart Rate:  [80-97]   Temp:  [36.2 °C (97.2 °F)-36.9 °C (98.4 °F)]   Resp:  [17-18]   BP: (109-135)/(63-72)   Weight:  [105 kg (231 lb 12.8 oz)]   SpO2:  [95 %-98 %]         Intake/Output last 3 Shifts:    Intake/Output Summary (Last 24 hours) at 2/8/2025 1413  Last data filed at 2/8/2025 1350  Gross per 24 hour   Intake 700 ml   Output 1300 ml   Net -600 ml       Physical Exam    General Appearance; pleasantly confused  HEENT; pupils react to light and accommodation  Neck; no JVD no bruit no thyromegaly no cervical adenopathy  Lungs; clear to auscultation and percussion  Heart; regular rate no gallops or rubs mild tachycardia  Abdomen; active peristalsis no rebound guarding organomegaly or shifting dullness mild suprapubic tenderness  Extremities no edema  Neurological; decreased muscle tone no tremors or clonus  Relevant Results  Results for orders placed or performed during the hospital encounter of 02/05/25 (from the past 24 hours)   POCT GLUCOSE   Result Value Ref Range    POCT Glucose 125 (H) 74 - 99 mg/dL   POCT GLUCOSE   Result Value Ref Range    POCT Glucose 162 (H) 74 - 99 mg/dL   CBC   Result Value Ref Range    WBC 8.3 4.4 - 11.3 x10*3/uL    nRBC 0.0 0.0 - 0.0 /100 WBCs    RBC 3.60 (L) 4.50 - 5.90 x10*6/uL    Hemoglobin 11.4 (L) 13.5 - 17.5 g/dL     Hematocrit 33.7 (L) 41.0 - 52.0 %    MCV 94 80 - 100 fL    MCH 31.7 26.0 - 34.0 pg    MCHC 33.8 32.0 - 36.0 g/dL    RDW 12.5 11.5 - 14.5 %    Platelets 177 150 - 450 x10*3/uL   Basic Metabolic Panel   Result Value Ref Range    Glucose 114 (H) 74 - 99 mg/dL    Sodium 130 (L) 136 - 145 mmol/L    Potassium 4.3 3.5 - 5.3 mmol/L    Chloride 94 (L) 98 - 107 mmol/L    Bicarbonate 25 21 - 32 mmol/L    Anion Gap 15 10 - 20 mmol/L    Urea Nitrogen 65 (H) 6 - 23 mg/dL    Creatinine 1.13 0.50 - 1.30 mg/dL    eGFR 70 >60 mL/min/1.73m*2    Calcium 9.0 8.6 - 10.3 mg/dL   POCT GLUCOSE   Result Value Ref Range    POCT Glucose 115 (H) 74 - 99 mg/dL   POCT GLUCOSE   Result Value Ref Range    POCT Glucose 118 (H) 74 - 99 mg/dL     ECG 12 Lead    Result Date: 2/6/2025  Sinus tachycardia Probable left atrial enlargement Left anterior fascicular block LVH with secondary repolarization abnormality Borderline ST elevation, lateral leads    XR hip left with pelvis when performed 2 or 3 views    Result Date: 2/6/2025  Interpreted By:  Jose M Lo, STUDY: XR HIP LEFT WITH PELVIS WHEN PERFORMED 2 OR 3 VIEWS 2/5/2025 6:51 pm   INDICATION: Signs/Symptoms:Left femoral neck fracture nonunion   COMPARISON: None available.   ACCESSION NUMBER(S): [id number]   ORDERING CLINICIAN: MELISSA WILSON   TECHNIQUE: AP view of the pelvis with AP and lateral views of the left hip.   FINDINGS: Deformed prior left femoral neck fracture with callus formation. No new acute fractures. Prior right hip arthroplasty.       1. Deformed prior left femoral neck fracture with callus formation. No new acute fractures   Signed by: oJse M Lo 2/6/2025 7:21 AM Dictation workstation:   [id number]    US renal complete    Result Date: 2/5/2025  Interpreted By:  Vineet Pruitt, STUDY: US RENAL COMPLETE;  2/5/2025 1:49 pm   INDICATION: Signs/Symptoms:herman.   COMPARISON: None.   ACCESSION NUMBER(S): [id number]   ORDERING CLINICIAN: ISHA FONSECA   TECHNIQUE: Multiple images  of the kidneys were obtained  , with color Doppler for blood flow.  It is noted the exam was limited due to patient habitus , limited mobility and bowel gas.   FINDINGS: RIGHT KIDNEY: The right kidney measures 13.8 cm in length.  The renal cortex is within normal limits.   No hydronephrosis or evidence of nephrolithiasis. Color Doppler demonstrates renal blood flow.   LEFT KIDNEY: The left kidney measures 12.6 cm in length. The renal cortex is within normal limits.  An exophytic 5.3 x 4.7 cm cyst is present at the midpole. No hydronephrosis or evidence of nephrolithiasis.. Color Doppler demonstrates renal blood flow.   BLADDER: Decompressed with a Caldwell catheter and not well visualized or evaluated.   OTHER FINDINGS: None significant.       Unremarkable renal ultrasound.   Signed by: Vineet Pruitt 2/5/2025 5:01 PM Dictation workstation:   [id number]    CT abdomen pelvis wo IV contrast    Result Date: 2/5/2025  Interpreted By:  Mario Menchaca, STUDY: CT ABDOMEN PELVIS WO IV CONTRAST; 2/5/2025 2:10 pm   INDICATION: Signs/Symptoms:abdominal pain; decreased appetite.   COMPARISON: None available at this time. There is a previous CT abdomen and pelvis report dated 01/12/2025 which is available. The actual images are not available.   ACCESSION NUMBER(S): [id number]   ORDERING CLINICIAN: ISHA FONSECA   TECHNIQUE: COMMENTS: This exam is performed without oral or intravenous contrast as was requested. Please note that the absence of oral and intravenous contrast material does limit the sensitivity and specificity of this examination. In particular solid organ assessment for neoplasm is significantly limited. Assessment of the GI tract is also limited without the aid of oral contrast administration. Vascular abnormalities such as dissection, occlusions, infarcts and thrombus may also go undetected.   One or more of the following dose reduction techniques were used: Automated exposure control Adjustment of the mA and/or  kV according to patient size, and/or use of iterative reconstruction technique.   FINDINGS: COMMENTS: Exam is somewhat limited by motion artifact.   The visualized portions of the pulmonary bases are clear.  Bilateral gynecomastia is present. Within limits of this unenhanced study no focal masses are identified within the liver, spleen, pancreas, or adrenal glands.  The biliary tree is unremarkable.   Urinary Tract Assessment:   RT KIDNEY: No right sided urinary tract stones are identified. Within the limits of this unenhanced exam no solid renal masses are identified.   LT KIDNEY: No left sided urinary tract stones are identified.  There is a 4.1 cm cyst midpole left kidney laterally. Within the limits of this unenhanced exam no solid renal masses are identified.   There is an IVC filter. There is no evidence for abdominal aortic aneurysm. There is calcific plaque within the abdominal aorta. No retroperitoneal lymphadenopathy is identified.   There is a subacute/chronic appearing femoral neck fracture which has not been treated. There is impaction at the fracture site with varus deformity. A subacute/chronic left femoral neck fracture was described on the outside report. Please correlate with clinical history as to whether this requires orthopedic intervention for fixation with a left hip prosthesis. Patient is status post right hip prosthesis placement along with a femoral screw and plate fixation device laterally.   There are wedge fractures of T7, T11, L1, and L2. The L2 fracture appears to be acute/subacute. The T12 fracture appears acute/subacute.   Bowel loops are unremarkable in appearance although partially obscured by motion artifact.   PELVIC CT: No pathologic masses or fluid collections are identified. Caldwell catheter is present within the urinary bladder.       1. Left femoral neck fracture likely subacute or chronic. Orthopedic consultation is recommended for assessment of appropriate treatment. 2.  Multiple vertebral body compression fractures as above with the L2 and T12 fracture is thought to be acute/subacute. 3. Left renal cyst. 4. Limited exam due to motion.   MACRO: none   Signed by: Mario Menchaca 2/5/2025 3:21 PM Dictation workstation:   [id number]    XR chest 1 view    Result Date: 2/5/2025  Interpreted By:  Schoenberger, Joseph, STUDY: XR CHEST 1 VIEW;  2/5/2025 10:28 am   INDICATION: Signs/Symptoms:weak.     COMPARISON: None.   ACCESSION NUMBER(S): [id number]   ORDERING CLINICIAN: KRISTEN BAEZ   FINDINGS:         CARDIOMEDIASTINAL SILHOUETTE: Cardiomediastinal silhouette is normal in size and configuration.   LUNGS: Lungs are clear.   ABDOMEN: No remarkable upper abdominal findings.   BONES: No acute osseous changes.       1.  No evidence of acute cardiopulmonary process.       MACRO: None   Signed by: Joseph Schoenberger 2/5/2025 10:30 AM Dictation workstation:   [id number]    CT head wo IV contrast    Result Date: 1/12/2025  EXAMINATION: CT HEAD W/O CONTRAST 01/12/2025 01:42 PM CLINICAL HISTORY: Trauma; fall ASSOCIATED DIAGNOSIS: Trauma fall ORDERING PROVIDER: RICHELLE WHITTINGTON TECHNOLOGISTS NOTE: COMPARISON: CT HEAD W/O CONTRAST 1/4/2025, 10:57 AM TECHNIQUE: Thin axial imaging of the head was performed without intravenous contrast. FINDINGS: No mass or acute hemorrhage. No evidence of acute infarct. Moderate generalized brain parenchymal volume loss with commensurate ventricular caliber. Patchy and confluent foci of white matter hypoattenuation, most likely moderate chronic microvascular angiopathy. There are atherosclerotic calcifications of the carotid siphons. Mild paranasal sinus mucosal thickening. There are a couple of mucous retention cysts within the left maxillary sinus. No acute osseous abnormalities.   IMPRESSION: No acute intracranial abnormality. MACRO: None I have personally reviewed the images and agree with the resident's interpretation.    CT cervical spine wo IV  contrast    Result Date: 1/12/2025  EXAMINATION: CT C-SPINE W/O CONTRAST 01/12/2025 01:42 PM CLINICAL HISTORY: Trauma; fall ASSOCIATED DIAGNOSIS: Trauma fall ORDERING PROVIDER: RICHELLE GARY NOTE: COMPARISON: None TECHNIQUE: Thin isotropic axial images were obtained from the skull base to the upper thoracic spine without intravenous contrast. 2D sagittal and coronal reconstructions were obtained from the axial data. FINDINGS: Vertebrae: No acute fracture or traumatic malalignment. No aggressive osseous lesions. Mild multilevel spondylosis causing at least mild canal narrowing, greatest at C6-C7. Soft Tissues: No acute abnormality. IMPRESSION: No acute cervical spine fracture or traumatic malalignment. MACRO: None I have personally reviewed the images and agree with the resident's interpretation.    CT T-SPINE/L-SPINE W/O CONTRAST    Result Date: 1/12/2025  EXAMINATION: CT T-SPINE/L-SPINE W/O CONTRASTPRO/PRO   01/12/2025 01:42 PM CLINICAL HISTORY: Trauma; fall ASSOCIATED DIAGNOSIS: Trauma fall ORDERING PROVIDER: RICHELLE WHITTINGTON TECHNRUCHI NOTE: COMPARISON: CT of the chest abdomen and pelvis the same day. CT T-SPINE/L-SPINE W/O CONTRAST 8/11/2024, 3:31 PM TECHNIQUE: Thin axial images were obtained through the thoracic and lumbar spine without intravenous contrast. 2D sagittal and coronal reconstructions were obtained from the axial data. FINDINGS: Severe osteopenia is present. There is one third loss of height of L2 vertebral body with a prominent Schmorl's node at the superior endplate. This is late subacute to chronic and there is interval loss of height as compared to prior MR of 8/14/2024. There is chronic loss of height of L1 which is grossly unchanged from the prior MRI. Anterior wedging and loss of height of T11 vertebral body is again seen and is grossly unchanged. Multilevel degenerative changes are present in the thoracolumbar spine. Mild loss of height of T7 and T8 vertebral bodies appear chronic.  The pedicles and facets are intact. Visible sacrum and pelvis: No acute abnormality. IMPRESSION: Prominent osteopenia. There is interval progression of loss of height of L2 vertebral body. This is late subacute to chronic. Loss of height of additional thoracolumbar vertebral bodies as described above. MACRO: None I have personally reviewed the images and agree with the resident's interpretation.    CT chest abdomen pelvis w IV contrast    Result Date: 1/12/2025  EXAMINATION: CT CHEST/ABD/PELVIS W/ CONTRAST 01/12/2025 01:42 PM CLINICAL HISTORY: Trauma; fall ASSOCIATED DIAGNOSIS: Trauma fall ORDERING PROVIDER: [facility] NELSY TECHNOLOGISTS NOTE: COMPARISON: CT CHEST/ABD/PELVIS W/ 9/27/2018, 6:03 PM TECHNIQUE: Contiguous axial images were obtained through the chest abdomen and pelvis from the level of the thoracic inlet through the pubic symphysis following administration of  intravenous contrast. MPR sagittal and coronal reconstructions were obtained from the axial data. Before infusion of intravenous contrast, radiology personnel investigated the possibility of an allergic history and of any history of reaction to iodinated contrast material. Contrast Protocol: Omnipaque 350 [>or =100lb] 100 ml [<100 lb] 1 ml per 1 lb. INTRA-PROCEDURE MEDS: iohexol (OMNIPAQUE) 350 MG/ML injection 100 mL Route: Intravenous Push FINDINGS: Cardiovasculature: The heart is normal in size. Atherosclerotic calcifications are present within the coronary arteries. Mediastinum/Pericardium: Unremarkable Pleura: Unremarkable Central Airways: Widely patent Lungs: No focal consolidation. Nodules: There are a few stable scattered subcentimeter pulmonary nodules not requiring dedicated imaging follow-up. Lymph Nodes: No thoracic lymphadenopathy is evident. Hepatobiliary: There is a stable subcentimeter low-density lesion in the dome, likely cyst or hemangioma. No biliary dilation. Pancreas: Unremarkable Spleen: Unremarkable Adrenal Glands: Unremarkable  Kidneys, ureters, and bladder: No calculi or hydroureteronephrosis. Multiple Bosniak I and/or Bosniak II renal cysts which do not meet imaging criteria for follow up. Small amount of gas in the urinary bladder. Abdominal and pelvic vasculature: Atherosclerotic wall calcifications are present without abdominal aortic aneurysm. Inferior vena cava filter. GI tract: No evidence of obstruction. The appendix is within normal limits. Colonic diverticulosis is present without evidence for diverticulitis. Peritoneum and retroperitoneum: No free fluid or free air. Lymph Nodes: No lymphadenopathy. Prostate and seminal vesicles: Unremarkable Visualized musculoskeletal structures: Subacute-chronic appearing fracture of left femoral neck. Postsurgical changes and hardware of right proximal femur/arthroplasty, appearing intact. Gynecomastia. Fracture deformities of the spine. Degenerative changes of the spine and shoulders. IMPRESSION: 1.  Subacute to chronic appearing fracture of left femoral neck. Please correlate with history and any acute pain in the region. 2.  Fracture deformities of the spine. Please see the separate interpretation of same-day CT spine exams for acuity descriptions. 3.  No other potentially acute traumatic injury is identified involving chest abdomen and pelvis. 4.  Nonspecific gas in the urinary bladder lumen. Please correlate with any recent instrumentation. 5.  Please see descriptions of other ancillary findings above. MACRO: None           Assessment/Plan    Acute kidney injury secondary to obstructive uropathy resolved    Hyponatremia secondary to SIADH  Simple renal cyst  Thrombocytopenia  Simple renal cyst  Benign prostatic enlargement  Diabetes  Secondary hyperparathyroidism of renal origin  Rule out primary hyperparathyroidism   hyperuricemia  Plan    Calcitriol Monday Wednesday Fridays  Add Ergo Calciferol weekly  Consider the use of Cinacalcet if PTH levels do not trend down        Assessment &  Plan  CELESTE (acute kidney injury) (CMS-[facility])              I spent 30 minutes in the professional and overall care of this patient.      Cristobal Bautista MD

## 2025-02-08 NOTE — DISCHARGE SUMMARY
Discharge Diagnosis  CELESTE (acute kidney injury) (CMS-[facility])    Issues Requiring Follow-Up  Urinary retention    Discharge Meds     Medication List      START taking these medications     ergocalciferol 1.25 MG (87463 UT) capsule; Commonly known as: Vitamin   D-2; Take 1 capsule (1,250 mcg) by mouth 1 (one) time per week.; Start   taking on: February 15, 2025     CHANGE how you take these medications     divalproex 250 mg EC tablet; Commonly known as: Depakote; Take 1 tablet   (250 mg) by mouth 2 times a day.; What changed: additional instructions,   Another medication with the same name was removed. Continue taking this   medication, and follow the directions you see here.   losartan 25 mg tablet; Commonly known as: Cozaar; Take 1 tablet (25 mg)   by mouth once daily in the morning.; What changed: medication strength,   how much to take   metoprolol succinate XL 50 mg 24 hr tablet; Commonly known as:   Toprol-XL; Take 1 tablet (50 mg) by mouth 2 times a day. Do not crush or   chew.; What changed: when to take this, additional instructions     CONTINUE taking these medications     acetaminophen 500 mg tablet; Commonly known as: Tylenol   ARIPiprazole 5 mg tablet; Commonly known as: Abilify   aspirin 81 mg chewable tablet   atorvastatin 20 mg tablet; Commonly known as: Lipitor   fexofenadine 180 mg tablet; Commonly known as: Allegra   levothyroxine 50 mcg tablet; Commonly known as: Synthroid, Levoxyl   lubricating eye drops ophthalmic solution   meclizine 12.5 mg tablet; Commonly known as: Antivert   ondansetron 4 mg tablet; Commonly known as: Zofran; Take 1 tablet (4 mg)   by mouth every 6 hours if needed for nausea or vomiting.   pantoprazole 20 mg EC tablet; Commonly known as: ProtoNix   * PHENobarbital 32.4 mg tablet; Commonly known as: Luminal; 1 by mouth   in morning, 2 in evening   * PHENobarbital 32.4 mg tablet; Commonly known as: Luminal   tamsulosin 0.4 mg 24 hr capsule; Commonly known as: Flomax  * This list  has 2 medication(s) that are the same as other medications   prescribed for you. Read the directions carefully, and ask your doctor or   other care provider to review them with you.     STOP taking these medications     amLODIPine 5 mg tablet; Commonly known as: Norvasc   esomeprazole 20 mg DR capsule; Commonly known as: NexIUM   FLUoxetine 20 mg capsule; Commonly known as: PROzac   metFORMIN 500 mg tablet; Commonly known as: Glucophage   metoprolol tartrate 50 mg tablet; Commonly known as: Lopressor   naproxen 500 mg tablet; Commonly known as: Naprosyn   rosuvastatin 10 mg tablet; Commonly known as: Crestor   tiZANidine 4 mg tablet; Commonly known as: Zanaflex       Test Results Pending At Discharge  Pending Labs       Order Current Status    Citrate, Urine In process    PSA, total and free In process    Blood Culture Preliminary result    Blood Culture Preliminary result            Hospital Course   Vnieet Lira is a 69 y.o. male presents from nursing facility for worsening kidney function on lab work yesterday.  History somewhat limited from patient.  Does admit to occasional mild nausea, generalized abdominal pain, and decreased appetite/oral intake over the past 3-4 days.  Does report he has not been peeing as much over the past week or so.  States this has happened in the past, however is unable to tell me any more information about it. Denies any pain with urination or bleeding.  Does report he has been feeling weaker, and of note, patient seen multiple times in the past month at [facility] for evaluation after falls. Patient currently denying any symptoms in the ED aside from mild abdominal discomfort.  States he feels like he has not had an appetite recently.      CODE STATUS: Full code     #Acute kidney injury  #Acute urinary retention  #Mild hyponatremia, 132  #Hypochloremia, 90  #Tachycardia  #Abdominal pain, nausea, decreased appetite  Admit as inpatient with telemetry monitoring  Urinalysis shows  yellow and turbid appearance with 1+ protein, trace glucose, 1+ blood, negative nitrites, negative leukocyte esterase, 11-20 WBCs, 1+ bacteria, 2+ hyaline cast, 2+ granular casts  Nephrology consult  Urology consult  -BUN 90/creatinine 6.36 today, BUN 80/creatinine 6.31 on 2/4/25 (BUN 19/creatinine 0.63 on 1/12/2025)  Blood culture/urine culture pending  Continuous IV fluids, given 500 mL NS bolus in the ED  Hold off on nephrotoxic medications  Bilateral renal ultrasound ordered  CT abdomen/pelvis without contrast ordered  Urine osmolality/urine sodium ordered, CK added onto lab work  Tylenol, Zofran as needed  N.p.o. until CT abdomen/pelvis results  Q 4 vitals  CBC, BMP in the a.m.  Will start on IV Rocephin empirically until urine culture results  -Per home medication review, patient takes 100 mg losartan daily and 20 mg Lipitor daily, will hold.  Consider cardiology consult for tachycardia pending clinical course, will defer to attending.     -1605, notified by RN that CT abdomen/pelvis resulted.  Shows left femoral neck fracture likely subacute or chronic, orthopedic consultation recommended.  She has multiple vertebral body compression fractures with the L2 and T12 fracture, thought to be acute/subacute, left renal cyst.  Similar findings on CT abdomen/pelvis from 1/12 after patient was evaluated after a fall, however per my EMR review does not appear Ortho was consulted at that time.  Will consult orthopedic surgery team for further evaluation.     #Diabetes mellitus  Sliding scale insulin  ACHS Accu-Cheks     #Thrombocytopenia  Platelets 144 today, platelets 184 on 1/12  Monitor with a.m. labs     #Generalized weakness/history of frequent falls  PT/OT for evaluation and treatment  Bed alarm, fall precautions     Continue home medications when med rec is completed     Chronic conditions:  Obesity, history of seizures, frequent falls, BPH, GERD, depression, hypothyroidism, vitamin B12 deficiency, DM, HLD, HTN      #DVT prophylaxis  SCDs, ambulation as tolerated  Heparin     2/6: Caldwell placed for urinary retention. Started on Flomax per Urology. On IVF. Creatinine improved from 6.3 marianne nto 4.1. Patient with 1.25L urine output over 24 hours. Decrease amlodipine and increase metoprolol given soft BP and tachycardia.     2/7: Creatinine improved from 4.1 down to 1.9. BUN also improved from 96 down to 84. Patient is at his mental baseline. Suspect any confusion was related to uremia. TOV this admission per Urology, will attempt tomorrow. Can consider outpatient surgery for femoral neck nonunion. However, patient states he has not walked in over 6 months and does not think he will be able to walk in the future, even with a surgery.    2/8: Creatinine improved to 1.1. BUN improved to 65. TOV performed. Patient discharged back to SNF.    Pertinent Physical Exam At Time of Discharge  Physical Exam  HENT:      Head: Normocephalic and atraumatic.      Nose: Nose normal.      Mouth/Throat:      Mouth: Mucous membranes are moist.      Pharynx: Oropharynx is clear.   Eyes:      Extraocular Movements: Extraocular movements intact.      Pupils: Pupils are equal, round, and reactive to light.   Cardiovascular:      Rate and Rhythm: Normal rate and regular rhythm.   Pulmonary:      Effort: No respiratory distress.      Breath sounds: Normal breath sounds. No wheezing, rhonchi or rales.   Abdominal:      General: Bowel sounds are normal. There is no distension.      Palpations: Abdomen is soft.      Tenderness: There is no abdominal tenderness. There is no guarding.   Musculoskeletal:      Right lower leg: No edema.      Left lower leg: No edema.   Skin:     General: Skin is warm and dry.   Neurological:      Mental Status: He is alert. Mental status is at baseline.         Outpatient Follow-Up  Future Appointments   Date Time Provider Department Center   2/18/2025  9:30 AM Darian Borja MD [id number] None   3/17/2025  1:15 PM Maxi  MIKE Garces MD [id number] [facility]         Kyle Jackson, DO

## 2025-02-09 LAB
BACTERIA BLD CULT: NORMAL
BACTERIA BLD CULT: NORMAL

## 2025-02-10 LAB
CITRATE 24H UR-MRATE: 20 MG/D (ref 320–1240)
CITRATE UR-MCNC: 13 MG/L
CITRATE/CREAT UR: 9 MG/G
COLLECT DURATION TIME SPEC: 24 HR
CREAT 24H UR-MRATE: 2115 MG/D (ref 800–2100)
CREAT UR-MCNC: 141 MG/DL
PSA FREE MFR SERPL: 16 %
PSA FREE SERPL-MCNC: 0.5 NG/ML
PSA SERPL IA-MCNC: 3.1 NG/ML (ref 0–4)
SPECIMEN VOL ?TM UR: 1500 ML

## 2025-02-18 ENCOUNTER — APPOINTMENT (OUTPATIENT)
Facility: CLINIC | Age: 70
End: 2025-02-18
Payer: MEDICARE

## 2025-02-26 LAB
ATRIAL RATE: 124 BPM
P AXIS: -5 DEGREES
PR INTERVAL: 123 MS
Q ONSET: 249 MS
QRS COUNT: 20 BEATS
QRS DURATION: 105 MS
QT INTERVAL: 291 MS
QTC CALCULATION(BAZETT): 420 MS
QTC FREDERICIA: 371 MS
R AXIS: -42 DEGREES
T AXIS: 77 DEGREES
T OFFSET: 395 MS
VENTRICULAR RATE: 125 BPM

## 2025-03-17 ENCOUNTER — APPOINTMENT (OUTPATIENT)
Dept: NEUROLOGY | Facility: CLINIC | Age: 70
End: 2025-03-17
Payer: MEDICARE

## 2025-05-28 ENCOUNTER — LAB REQUISITION (OUTPATIENT)
Dept: LAB | Facility: HOSPITAL | Age: 70
End: 2025-05-28
Payer: MEDICARE

## 2025-05-28 DIAGNOSIS — T42.3X1A POISONING BY BARBITURATES, ACCIDENTAL (UNINTENTIONAL), INITIAL ENCOUNTER: ICD-10-CM

## 2025-05-28 LAB — PHENOBARB SERPL-MCNC: 10.1 UG/ML (ref 10–40)

## 2025-05-28 PROCEDURE — 80184 ASSAY OF PHENOBARBITAL: CPT | Mod: OUT | Performed by: INTERNAL MEDICINE

## 2025-07-28 ENCOUNTER — APPOINTMENT (OUTPATIENT)
Dept: NEUROLOGY | Facility: CLINIC | Age: 70
End: 2025-07-28
Payer: MEDICARE

## 2025-08-13 ENCOUNTER — APPOINTMENT (OUTPATIENT)
Dept: NEUROLOGY | Facility: CLINIC | Age: 70
End: 2025-08-13
Payer: MEDICARE

## 2025-08-22 DIAGNOSIS — G40.209 PARTIAL SYMPTOMATIC EPILEPSY WITH COMPLEX PARTIAL SEIZURES, NOT INTRACTABLE, WITHOUT STATUS EPILEPTICUS: Primary | ICD-10-CM

## 2025-08-22 RX ORDER — PHENOBARBITAL 32.4 MG/1
64.8 TABLET ORAL NIGHTLY
Qty: 180 TABLET | Refills: 1 | Status: SHIPPED | OUTPATIENT
Start: 2025-08-22 | End: 2026-02-18

## 2025-10-24 ENCOUNTER — APPOINTMENT (OUTPATIENT)
Dept: NEUROLOGY | Facility: CLINIC | Age: 70
End: 2025-10-24
Payer: MEDICARE